# Patient Record
Sex: FEMALE | Race: WHITE | HISPANIC OR LATINO | Employment: PART TIME | URBAN - METROPOLITAN AREA
[De-identification: names, ages, dates, MRNs, and addresses within clinical notes are randomized per-mention and may not be internally consistent; named-entity substitution may affect disease eponyms.]

---

## 2019-11-05 ENCOUNTER — OFFICE VISIT - HEALTHEAST (OUTPATIENT)
Dept: INTERNAL MEDICINE | Facility: CLINIC | Age: 34
End: 2019-11-05

## 2019-11-05 DIAGNOSIS — R10.11 RUQ ABDOMINAL PAIN: ICD-10-CM

## 2019-11-05 DIAGNOSIS — K80.00 CALCULUS OF GALLBLADDER WITH ACUTE CHOLECYSTITIS WITHOUT OBSTRUCTION: ICD-10-CM

## 2019-11-05 RX ORDER — SERTRALINE HYDROCHLORIDE 100 MG/1
TABLET, FILM COATED ORAL
Status: SHIPPED | COMMUNITY
Start: 2019-03-19

## 2019-11-05 ASSESSMENT — MIFFLIN-ST. JEOR: SCORE: 1632.55

## 2019-11-08 ENCOUNTER — SURGERY - HEALTHEAST (OUTPATIENT)
Dept: SURGERY | Facility: CLINIC | Age: 34
End: 2019-11-08

## 2019-11-08 ENCOUNTER — OFFICE VISIT - HEALTHEAST (OUTPATIENT)
Dept: SURGERY | Facility: CLINIC | Age: 34
End: 2019-11-08

## 2019-11-08 DIAGNOSIS — K80.50 BILIARY COLIC: ICD-10-CM

## 2019-11-08 ASSESSMENT — MIFFLIN-ST. JEOR: SCORE: 1654.32

## 2019-11-13 ASSESSMENT — MIFFLIN-ST. JEOR: SCORE: 1650.69

## 2019-11-15 ENCOUNTER — SURGERY - HEALTHEAST (OUTPATIENT)
Dept: SURGERY | Facility: AMBULATORY SURGERY CENTER | Age: 34
End: 2019-11-15

## 2019-11-15 ENCOUNTER — ANESTHESIA - HEALTHEAST (OUTPATIENT)
Dept: SURGERY | Facility: AMBULATORY SURGERY CENTER | Age: 34
End: 2019-11-15

## 2019-11-15 ASSESSMENT — MIFFLIN-ST. JEOR: SCORE: 1650.69

## 2019-11-29 ENCOUNTER — OFFICE VISIT - HEALTHEAST (OUTPATIENT)
Dept: SURGERY | Facility: CLINIC | Age: 34
End: 2019-11-29

## 2019-11-29 DIAGNOSIS — K26.9 DUODENAL ULCER WITHOUT HEMORRHAGE OR PERFORATION AND WITHOUT OBSTRUCTION: ICD-10-CM

## 2019-11-29 DIAGNOSIS — K27.9 PEPTIC ULCER: ICD-10-CM

## 2019-11-29 RX ORDER — LANSOPRAZOLE 30 MG/1
30 CAPSULE, DELAYED RELEASE ORAL DAILY
Qty: 30 CAPSULE | Refills: 2 | Status: SHIPPED | OUTPATIENT
Start: 2019-11-29

## 2019-11-29 RX ORDER — HYDROCODONE BITARTRATE AND ACETAMINOPHEN 5; 325 MG/1; MG/1
1-2 TABLET ORAL
Status: SHIPPED | COMMUNITY
Start: 2019-11-29 | End: 2021-10-14

## 2019-11-29 RX ORDER — OMEPRAZOLE 20 MG/1
20 TABLET, DELAYED RELEASE ORAL
Status: SHIPPED | COMMUNITY
Start: 2019-11-29 | End: 2021-10-14

## 2019-11-29 ASSESSMENT — MIFFLIN-ST. JEOR: SCORE: 1637.09

## 2021-05-28 ENCOUNTER — RECORDS - HEALTHEAST (OUTPATIENT)
Dept: ADMINISTRATIVE | Facility: CLINIC | Age: 36
End: 2021-05-28

## 2021-05-30 ENCOUNTER — RECORDS - HEALTHEAST (OUTPATIENT)
Dept: ADMINISTRATIVE | Facility: CLINIC | Age: 36
End: 2021-05-30

## 2021-05-31 ENCOUNTER — RECORDS - HEALTHEAST (OUTPATIENT)
Dept: ADMINISTRATIVE | Facility: CLINIC | Age: 36
End: 2021-05-31

## 2021-06-02 ENCOUNTER — RECORDS - HEALTHEAST (OUTPATIENT)
Dept: ADMINISTRATIVE | Facility: CLINIC | Age: 36
End: 2021-06-02

## 2021-06-03 VITALS — HEIGHT: 68 IN | WEIGHT: 200 LBS | BODY MASS INDEX: 30.31 KG/M2

## 2021-06-03 VITALS
TEMPERATURE: 97.7 F | OXYGEN SATURATION: 98 % | WEIGHT: 196 LBS | RESPIRATION RATE: 20 BRPM | HEART RATE: 83 BPM | BODY MASS INDEX: 29.7 KG/M2 | DIASTOLIC BLOOD PRESSURE: 88 MMHG | SYSTOLIC BLOOD PRESSURE: 128 MMHG | HEIGHT: 68 IN

## 2021-06-03 VITALS
RESPIRATION RATE: 16 BRPM | DIASTOLIC BLOOD PRESSURE: 80 MMHG | OXYGEN SATURATION: 98 % | WEIGHT: 200.8 LBS | HEART RATE: 72 BPM | SYSTOLIC BLOOD PRESSURE: 116 MMHG | BODY MASS INDEX: 30.43 KG/M2 | HEIGHT: 68 IN

## 2021-06-03 NOTE — PROGRESS NOTES
Internal Medicine Office Visit  [facility]  Patient Name: Karla G Reyes  Patient Age: 34 y.o.  YOB: 1985  MRN: [id number]    Date of Visit: 2019  Reason for Office Visit:   Chief Complaint   Patient presents with     Hospital Visit Follow Up     seen by ED for gall stones. Was told by ED to follow up with PCP and then proceed with possible surgery. Still having pain in the upper right abd.            Assessment / Plan / Medical Decision Makin. RUQ abdominal pain  2. Calculus of gallbladder with acute cholecystitis without obstruction  - ondansetron (ZOFRAN) 4 MG tablet; Take 1 tablet (4 mg total) by mouth every 6 (six) hours for 3 days.  Dispense: 12 tablet; Refill: 0  Frequently utilizing the Percocet at bedtime as needed, specialty scheduling has been contacted they will reach out to general surgery to assist patient in scheduling appointment to discuss possible surgical intervention.    No orders of the defined types were placed in this encounter.  Followup: Return in about 1 week (around 2019). earlier if needed.    Health Maintenance Review  Health Maintenance   Topic Date Due     DEPRESSION FOLLOW UP  1985     PREVENTIVE CARE VISIT  1985     HPV TEST  1985     HIV SCREENING  2000     TD 18+ HE  2003     ADVANCE CARE PLANNING  2003     PAP SMEAR  2010     INFLUENZA VACCINE RULE BASED (1) 2019     TDAP ADULT ONE TIME DOSE  Completed         I am having Karla G. Reyes maintain her ibuprofen, hydrOXYzine pamoate, gabapentin, risperiDONE, traZODone, HYDROcodone-acetaminophen, sertraline, and ondansetron.      HPI:  Karla G Reyes is a 34 y.o. year old who presents to the office today accompanied by an .  Chronic conditions include esophageal reflux, anxiety, depression.  Patient was seen in the ED due to right upper quadrant abdominal discomfort on 2019.  She reported at that time  "the right flank pain started 2 days prior and felt more like pressure and worsened with moving, eating and breathing.  Patient also indicated she had felt bloated.  She was seen in walk-in care on 10/31/2019 for epigastric pain that radiates to the right side and had taken some Zantac without relief, radiological studies were completed that showed mild to moderate amount of stool and no apparent proximal colon without obstruction and ultrasound was unable to be performed but recommended and urinalysis was positive for UTI patient was subsequently started on Bactrim though patient continued to report suprapubic pain.  Patient denied any chest pain or vomiting.  Physical exam in the ED indicated tenderness in the right upper quadrant and epigastric region though no guarding or rebound noted.  Ultrasound of the abdomen showed tiny stones and sludge in the gallbladder without evidence of cholecystitis or biliary dilatation and fatty of trait of the liver was noted.  It was suspected that patient's symptoms were caused by cholelithiasis and had placed a consult to general surgery.  It was felt that this could be managed in the outpatient but patient was advised if her symptoms worsen she should return to the ED.    She reports the RUQ pain continues describing it at a \"pressure\" radiating to her back.  She states the pain increases with movement. She reports she has some nausea without vomiting and is taking the Zofran noting improvement of nauseas. She states she feels bloated with eating. She takes the percocet as needed and has not taken since last night.    Review of Systems- pertinent positive in bold:  Constitutional: Fever, chills, night sweats, fainting, weight change, fatigue, dizziness, sleeping difficulties, loud snoring/pauses in breathing  Eyes: change in vision, blurred or double vision, redness/eye pain  Ears, nose, mouth, throat: change in hearing, ear pain, hoarseness, difficulty swallowing, sores in the " mouth or throat  Respiratory: shortness of breath, cough, bloody sputum, wheezing  Cardiovascular: chest pain, palpitations   Gastrointestinal: abdominal pain, heartburn/indigestion, nausea/vomiting, change in appetite, change in bowel habits, constipation or diarrhea, rectal bleeding/dark stools, difficulty swallowing  Urinary: painful urination, frequent urination, urinary urgency/incontinence, blood in urine/dark urine, nocturia (new or increasing), muscle cramps, swelling of hands, feet, ankles, leg pain/redness  Skin: change in moles/freckles, rash, nodules  Hematologic/lymphatic: swollen lymph glands, abnormal bruising/bleeding  Endocrine: excessive thirst/urination, cold or heat intolerance  Neurologic/emotional: worrisome memory change, numbness/tingling, anxiety, mood swings      Current Scheduled Meds:  Outpatient Encounter Medications as of 11/5/2019   Medication Sig Dispense Refill     HYDROcodone-acetaminophen 5-325 mg per tablet Take 1 tablet by mouth every 4 (four) hours as needed for pain. 10 tablet 0     hydrOXYzine pamoate (VISTARIL) 25 MG capsule Take 25 mg by mouth 3 (three) times a day as needed for itching.       ibuprofen (ADVIL,MOTRIN) 200 MG tablet Take 400 mg by mouth every 6 (six) hours as needed for pain.       sertraline (ZOLOFT) 100 MG tablet Take 1 1/2 tab x tab daily       gabapentin (NEURONTIN) 100 MG capsule Take 100 mg by mouth 3 (three) times a day. 90 capsule 0     ondansetron (ZOFRAN) 4 MG tablet Take 1 tablet (4 mg total) by mouth every 6 (six) hours for 3 days. 12 tablet 0     risperiDONE (RISPERDAL) 0.25 MG tablet Take 1 tablet (0.25 mg total) by mouth 3 (three) times a day. 90 tablet 0     traZODone (DESYREL) 50 MG tablet Take 1 tablet (50 mg total) by mouth at bedtime as needed, may repeat once for sleep (melatonin augmentation or failure). 30 tablet 0     [DISCONTINUED] ondansetron (ZOFRAN) 4 MG tablet Take 1 tablet (4 mg total) by mouth every 6 (six) hours for 3 days. 12  "tablet 0     No facility-administered encounter medications on file as of 2019.      Past Medical History:   Diagnosis Date     Acid reflux      Anxiety      Depression      Medical history non-contributory      Past Surgical History:   Procedure Laterality Date      SECTION        SECTION       Social History     Tobacco Use     Smoking status: Never Smoker     Smokeless tobacco: Never Used   Substance Use Topics     Alcohol use: No     Drug use: No     No family history on file.  Objective / Physical Examination:  Vitals:    19 1641   BP: 128/88   Pulse: 83   Resp: 20   Temp: 97.7  F (36.5  C)   SpO2: 98%   Weight: 196 lb (88.9 kg)   Height: 5' 8\" (1.727 m)     Wt Readings from Last 3 Encounters:   19 196 lb (88.9 kg)   19 195 lb (88.5 kg)   18 200 lb (90.7 kg)     Body mass index is 29.8 kg/m .     General Appearance: Alert and oriented, cooperative, affect appropriate, speech clear, in no apparent distress  Head: Normocephalic, atraumatic  Eyes: Conjunctivae clear and sclerae non-icteric  Throat: Lips and mucosa moist.   Neck: Supple, trachea midline. No cervical adenopathy  Lungs: Clear to auscultation bilaterally. No adventitious lung sounds noted. Normal inspiratory and expiratory effort  Cardiovascular: Regular rate, normal S1, S2. No murmurs, rubs, or gallops  Abdomen: Bowel sounds active all four quadrants. Soft, mild RUQ tender upon palpation. No hepatomegaly or splenomegaly.   Extremities: Pulses 2+ and equal throughout. No edema.   Integumentary: Warm and dry.   Neuro: Alert and oriented, follows commands appropriately.     Us Abdomen Limited    Result Date: 2019  EXAM: US ABDOMEN LIMITED LOCATION: [facility] DATE/TIME: 2019 10:41 PM INDICATION: Abdominal pain. COMPARISON: None. TECHNIQUE: Limited abdominal ultrasound. FINDINGS: GALLBLADDER: There are tiny stones and probable sludge in the gallbladder. There a few areas of ringdown artifact " from the anterior gallbladder wall which can be seen with adenomyomatosis. There is no gallbladder wall thickening. No sonographic Acuna sign. BILE DUCTS: No biliary dilatation. The common duct is obscured by bowel gas. LIVER: Diffuse fatty infiltration of the liver. No focal mass. RIGHT KIDNEY: No hydronephrosis. PANCREAS: Obscured by bowel gas. No ascites.     1.  Tiny stones and sludge in the gallbladder. No evidence of cholecystitis. No biliary dilatation. 2.  Fatty infiltration of the liver.    Recent Results (from the past 240 hour(s))   Comprehensive metabolic panel   Result Value Ref Range    Sodium 140 136 - 145 mmol/L    Potassium 3.9 3.5 - 5.0 mmol/L    Chloride 105 98 - 107 mmol/L    CO2 26 22 - 31 mmol/L    Anion Gap, Calculation 9 5 - 18 mmol/L    Glucose 86 70 - 125 mg/dL    BUN 16 8 - 22 mg/dL    Creatinine 0.85 0.60 - 1.10 mg/dL    GFR MDRD Af Amer >60 >60 mL/min/1.73m2    GFR MDRD Non Af Amer >60 >60 mL/min/1.73m2    Bilirubin, Total 0.1 0.0 - 1.0 mg/dL    Calcium 9.3 8.5 - 10.5 mg/dL    Protein, Total 7.5 6.0 - 8.0 g/dL    Albumin 3.4 (L) 3.5 - 5.0 g/dL    Alkaline Phosphatase 95 45 - 120 U/L    AST 30 0 - 40 U/L    ALT 42 0 - 45 U/L   Lipase   Result Value Ref Range    Lipase 18 0 - 52 U/L   Pregnancy HCG, qualitative   Result Value Ref Range    Beta hCG Qualitative Negative Negative   C-Reactive Protein   Result Value Ref Range    CRP 4.7 (H) 0.0 - 0.8 mg/dL   HM1 (CBC with Diff)   Result Value Ref Range    WBC 10.3 4.0 - 11.0 thou/uL    RBC 4.53 3.80 - 5.40 mill/uL    Hemoglobin 12.9 12.0 - 16.0 g/dL    Hematocrit 38.4 35.0 - 47.0 %    MCV 85 80 - 100 fL    MCH 28.5 27.0 - 34.0 pg    MCHC 33.6 32.0 - 36.0 g/dL    RDW 13.2 11.0 - 14.5 %    Platelets 280 140 - 440 thou/uL    MPV 11.0 8.5 - 12.5 fL    Neutrophils % 53 50 - 70 %    Lymphocytes % 36 20 - 40 %    Monocytes % 9 2 - 10 %    Eosinophils % 2 0 - 6 %    Basophils % 1 0 - 2 %    Neutrophils Absolute 5.4 2.0 - 7.7 thou/uL    Lymphocytes  Absolute 3.7 0.8 - 4.4 thou/uL    Monocytes Absolute 0.9 0.0 - 0.9 thou/uL    Eosinophils Absolute 0.2 0.0 - 0.4 thou/uL    Basophils Absolute 0.1 0.0 - 0.2 thou/uL   Urinalysis-UC if Indicated   Result Value Ref Range    Color, UA Yellow Colorless, Yellow, Straw, Light Yellow    Clarity, UA Clear Clear    Glucose, UA Negative Negative    Bilirubin, UA Negative Negative    Ketones, UA Negative Negative, 60 mg/dL    Specific Gravity, UA 1.016 1.001 - 1.030    Blood, UA Small (!) Negative    pH, UA 6.0 4.5 - 8.0    Protein,  mg/dL (!) Negative mg/dL    Urobilinogen, UA <2.0 E.U./dL <2.0 E.U./dL, 2.0 E.U./dL    Nitrite, UA Negative Negative    Leukocytes, UA Trace (!) Negative    Bacteria, UA Few (!) None Seen hpf    RBC, UA 0-2 None Seen, 0-2 hpf    WBC, UA 5-10 (!) None Seen, 0-5 hpf    Squam Epithel, UA  (!) None Seen, 0-5 lpf    Mucus, UA Few (!) None Seen lpf   Culture, Urine   Result Value Ref Range    Culture No Growth        Julianna Muniz, CNP

## 2021-06-03 NOTE — PROGRESS NOTES
"HISTORY:  Karla B Reyes Garcia is s/p laparoscopic cholecystectomy on 11/15.  She presents with an .  Intraoperatively, it was suspected that she had a sealed off perforated ulcer.  Since her cholecystectomy, the symptoms she had have resolved.  Although, she does have some postprandial abdominal pain still.  It is located in the epigastric region.  She has been taking the Prilosec, but it makes her feel anxious.  She has not taken it for the last 3 days.    PHYSICAL EXAM:  Vitals:    11/29/19 0919   BP: 112/78   Pulse: 75   Resp: 14   Temp: 97.3  F (36.3  C)   TempSrc: Tympanic   SpO2: 97%   Weight: 197 lb (89.4 kg)   Height: 5' 8\" (1.727 m)     GEN: No acute distress, comfortable  LUNGS: CTA bilaterally  CV: RRR  ABD: Band-Aids and Steri-Strips removed.  Underlying incisions healing well without signs of infection.  EXT: No cyanosis, edema or obvious abnormalities    PATHOLOGY:  MICROSCOPIC AND DIAGNOSIS:  GALLBLADDER, LAPAROSCOPIC CHOLECYSTECTOMY:       - CHRONIC CHOLECYSTITIS AND CHOLESTEROLOSIS    ASSESSMENT:  Karla B Reyes Garcia is s/p laparoscopic cholecystectomy    PLAN:  Intraoperative findings discussed with patient  Continue PPI - a prescription for Prevacid was sent to her pharmacy.  She is to take this daily.  Needs EGD.  Will have this set up through my office with a partner who performs upper endoscopy.  We will give her a call next week to set this up.    Deneen Lowry, [facility]  [phone]    "

## 2021-06-03 NOTE — ANESTHESIA POSTPROCEDURE EVALUATION
Patient: Karla B Reyes Garcia  CHOLECYSTECTOMY, LAPAROSCOPIC  Anesthesia type: general    Patient location: Phase II Recovery  Last vitals:   Vitals Value Taken Time   /75 11/15/2019  4:30 PM   Temp 36.4  C (97.6  F) 11/15/2019  3:57 PM   Pulse 61 11/15/2019  4:33 PM   Resp 18 11/15/2019  4:30 PM   SpO2 97 % 11/15/2019  4:33 PM   Vitals shown include unvalidated device data.  Post vital signs: stable  Level of consciousness: awake and responds to simple questions  Post-anesthesia pain: pain controlled  Post-anesthesia nausea and vomiting: no  Pulmonary: unassisted, return to baseline  Cardiovascular: stable and blood pressure at baseline  Hydration: adequate  Anesthetic events: no    QCDR Measures:  ASA# 11 - Caridad-op Cardiac Arrest: ASA11B - Patient did NOT experience unanticipated cardiac arrest  ASA# 12 - Caridad-op Mortality Rate: ASA12B - Patient did NOT die  ASA# 13 - PACU Re-Intubation Rate: ASA13B - Patient did NOT require a new airway mgmt  ASA# 10 - Composite Anes Safety: ASA10A - No serious adverse event    Additional Notes:

## 2021-06-03 NOTE — ANESTHESIA CARE TRANSFER NOTE
Last vitals:   Vitals:    11/15/19 1505   BP: 118/60   Pulse: 78   Resp: 14   Temp: 36.6  C (97.8  F)   SpO2: 100%     Patient's level of consciousness is drowsy  Spontaneous respirations: yes  Maintains airway independently: yes  Dentition unchanged: yes  Oropharynx: oropharynx clear of all foreign objects    QCDR Measures:  ASA# 20 - Surgical Safety Checklist: WHO surgical safety checklist completed prior to induction    PQRS# 430 - Adult PONV Prevention: 4558F - Pt received => 2 anti-emetic agents (different classes) preop & intraop  ASA# 8 - Peds PONV Prevention: NA - Not pediatric patient, not GA or 2 or more risk factors NOT present  PQRS# 424 - Caridad-op Temp Management: 4559F - At least one body temp DOCUMENTED => 35.5C or 95.9F within required timeframe  PQRS# 426 - PACU Transfer Protocol: - Transfer of care checklist used  ASA# 14 - Acute Post-op Pain: ASA14B - Patient did NOT experience pain >= 7 out of 10

## 2021-06-03 NOTE — ANESTHESIA PREPROCEDURE EVALUATION
Anesthesia Evaluation      Patient summary reviewed     Airway   Mallampati: III  Neck ROM: full   Pulmonary - negative ROS    breath sounds clear to auscultation  (-) not a smoker                         Cardiovascular - negative ROS  Exercise tolerance: > or = 4 METS  Rhythm: regular        Neuro/Psych    (+) depression, anxiety/panic attacks,     Endo/Other    (+) obesity,   (-) not pregnant     GI/Hepatic/Renal    (+)   chronic renal disease CRI,   (-) GERD     Other findings:     NPO > 8 hrs      Dental - normal exam                        Anesthesia Plan  Planned anesthetic: general endotracheal    Scop patch  Zofran, decadron 10 mg  Propofol gtt with minimal gas  Esmolol  Toradol if ok with surgeon    ASA 2   Induction: intravenous   Anesthetic plan and risks discussed with: patient  Anesthesia plan special considerations: antiemetics,   Post-op plan: routine recovery

## 2021-06-03 NOTE — PROGRESS NOTES
HPI:   Karla B Reyes Garcia is a 34 y.o. female referred to see me for cholelithiasis.  She presents with a  and her mother.  She notes a a 1 week history of epigastric abdominal pain described as a pressure.  The pain radiates to her right upper quadrant and around to her back.  She states that the pain is more like a burning in her back.  The pain has been constant since it started but with episodes of worsening.  The pain is worsened after she eats.  The pain is associated with bloating, belching, and nausea.  She has been worked up in the past for prior episodes of abdominal pain.  She has been diagnosed with UTI, and ultimately with cholelithiasis.  She denies any nausea, vomiting, fevers, chills, jaundice or any other symptoms at present.       Allergies:  Lisinopril    Past medical history:  GERD  Anxiety/Depression  OCD  CKD    Past surgical history:   x2  Tubal ligation    Current Outpatient Medications:      cyclobenzaprine (FLEXERIL) 10 MG tablet, Take 1 tablet (10 mg total) by mouth 2 (two) times a day as needed for muscle spasms., Disp: 20 tablet, Rfl: 0     gabapentin (NEURONTIN) 100 MG capsule, Take 100 mg by mouth 3 (three) times a day., Disp: 90 capsule, Rfl: 0     HYDROcodone-acetaminophen 5-325 mg per tablet, Take 1 tablet by mouth every 4 (four) hours as needed for pain., Disp: 10 tablet, Rfl: 0     hydrOXYzine pamoate (VISTARIL) 25 MG capsule, Take 25 mg by mouth 3 (three) times a day as needed for itching., Disp: , Rfl:      ibuprofen (ADVIL,MOTRIN) 200 MG tablet, Take 400 mg by mouth every 6 (six) hours as needed for pain., Disp: , Rfl:      naproxen (NAPROSYN) 375 MG tablet, Take 1 tablet (375 mg total) by mouth 2 (two) times a day with meals., Disp: 20 tablet, Rfl: 0     ondansetron (ZOFRAN) 4 MG tablet, Take 1 tablet (4 mg total) by mouth every 6 (six) hours for 3 days., Disp: 12 tablet, Rfl: 0     risperiDONE (RISPERDAL) 0.25 MG tablet, Take 1 tablet (0.25 mg  "total) by mouth 3 (three) times a day., Disp: 90 tablet, Rfl: 0     sertraline (ZOLOFT) 100 MG tablet, Take 1 1/2 tab x tab daily, Disp: , Rfl:      traZODone (DESYREL) 50 MG tablet, Take 1 tablet (50 mg total) by mouth at bedtime as needed, may repeat once for sleep (melatonin augmentation or failure)., Disp: 30 tablet, Rfl: 0    Family history:  There is a history of hypertension and diabetes on her father's side.  Mother is alive and healthy.    Social history:  Reports that she has never smoked. She has never used smokeless tobacco. She reports that she does not drink alcohol or use drugs.    Review of Systems:  General: No complaints or constitutional symptoms  Skin: No complaints or symptoms   Hematologic/Lymphatic: No symptoms or complaints  Psychiatric: No symptoms or complaints  Endocrine: No excessive fatigue, no hypermetabolic symptoms reported  Respiratory: No cough, shortness of breath, or wheezing  Cardiovascular: No chest pain or dyspnea on exertion  Gastrointestinal: As per HPI  Musculoskeletal: No recent injuries reported  Neurological: No focal neurologic defects reported.      EXAM:  /80   Pulse 72   Resp 16   Ht 5' 8\" (1.727 m)   Wt 200 lb 12.8 oz (91.1 kg)   LMP 10/27/2019 (Exact Date)   SpO2 98%   BMI 30.53 kg/m    Body mass index is 30.53 kg/m .  General: Alert, cooperative, appears stated age   Skin: Skin color, texture, turgor normal, no rashes or lesions   Lymphatic: No obvious adenopathy, no swelling   Eyes: No scleral icterus, pupils equal  HENT: No traumatic injury to the head or face, no gross abnormalities  Lungs: Normal respiratory effort, breath sounds equal bilaterally  Heart: Regular rate and rhythm  Abdomen: Soft, nondistended, nontender to palpation  Musculoskeletal: No obvious swelling or deformities  Neurologic: Grossly intact    LABS:  Lab Results   Component Value Date    WBC 10.3 11/02/2019    HGB 12.9 11/02/2019    HCT 38.4 11/02/2019    MCV 85 11/02/2019    "  11/02/2019     Results from last 7 days   Lab Units 11/02/19  2203   LN-SODIUM mmol/L 140   LN-POTASSIUM mmol/L 3.9   LN-CHLORIDE mmol/L 105   LN-CO2 mmol/L 26   LN-BLOOD UREA NITROGEN mg/dL 16   LN-CREATININE mg/dL 0.85   LN-CALCIUM mg/dL 9.3     Lab Results   Component Value Date    ALT 42 11/02/2019    AST 30 11/02/2019    ALKPHOS 95 11/02/2019    BILITOT 0.1 11/02/2019       IMAGES:   Pertinent images personally reviewed by myself and discussed with the patient.  Radiology reports:  EXAM: US ABDOMEN LIMITED  LOCATION: [facility]  DATE/TIME: 11/2/2019 10:41 PM     INDICATION: Abdominal pain.  COMPARISON: None.  TECHNIQUE: Limited abdominal ultrasound.     FINDINGS:     GALLBLADDER: There are tiny stones and probable sludge in the gallbladder. There a few areas of ringdown artifact from the anterior gallbladder wall which can be seen with adenomyomatosis. There is no gallbladder wall thickening. No sonographic Acuna   sign.     BILE DUCTS: No biliary dilatation. The common duct is obscured by bowel gas.     LIVER: Diffuse fatty infiltration of the liver. No focal mass.     RIGHT KIDNEY: No hydronephrosis.     PANCREAS: Obscured by bowel gas.     No ascites.     IMPRESSION:   1.  Tiny stones and sludge in the gallbladder. No evidence of cholecystitis. No biliary dilatation.  2.  Fatty infiltration of the liver.    Assessment/Plan:   Karla B Reyes Garcia is a 34 y.o. female with signs and symptoms consistent with biliary colic or biliary disease.  I have explained the pathophysiology of gallbladder disease in detail as well as the surgical versus non-operative management strategies.      The risks of surgery were discussed in detail which include, but are not limited to, bleeding, infection, injury to surrounding structures, the need to convert to an open procedure, blood clots, stroke, heart attack and death.  Specifically we discussed the risk of damage to the common bile duct and hepatic  vessels, and the complications that may arise from damage to these structures.  Additionally, the risks of non operative management were discussed which include, but are not limited to, worsening infection, increased pain, sepsis and death.     She understands everything which was discussed and has consented to proceed with a laparoscopic cholecystectomy.  Her procedure can be done on an outpatient basis at the outpatient surgery center under general anesthesia.  Postoperative recovery and restrictions were discussed.  She works as a cook.  She would need a 2 weeks off of work.  All of their questions were answered to satisfaction.  She is interested in scheduling a date for surgery, which we will schedule accordingly.     Deneen Lowry, DO   [facility]  [phone]

## 2021-06-04 VITALS
TEMPERATURE: 97.3 F | HEART RATE: 75 BPM | OXYGEN SATURATION: 97 % | SYSTOLIC BLOOD PRESSURE: 112 MMHG | DIASTOLIC BLOOD PRESSURE: 78 MMHG | RESPIRATION RATE: 14 BRPM | HEIGHT: 68 IN | WEIGHT: 197 LBS | BODY MASS INDEX: 29.86 KG/M2

## 2021-06-16 PROBLEM — F23 BRIEF PSYCHOTIC DISORDER (H): Status: ACTIVE | Noted: 2018-07-11

## 2021-06-16 PROBLEM — K80.50 BILIARY COLIC: Status: ACTIVE | Noted: 2019-11-08

## 2021-06-16 PROBLEM — F32.A DEPRESSION: Status: ACTIVE | Noted: 2018-08-07

## 2021-06-16 PROBLEM — F41.1 GENERALIZED ANXIETY DISORDER: Status: ACTIVE | Noted: 2018-07-06

## 2021-06-16 PROBLEM — F41.0 PANIC ATTACKS: Status: ACTIVE | Noted: 2018-07-11

## 2021-08-02 ENCOUNTER — APPOINTMENT (OUTPATIENT)
Dept: CT IMAGING | Facility: HOSPITAL | Age: 36
End: 2021-08-02
Attending: EMERGENCY MEDICINE
Payer: COMMERCIAL

## 2021-08-02 ENCOUNTER — HOSPITAL ENCOUNTER (EMERGENCY)
Facility: HOSPITAL | Age: 36
Discharge: HOME | End: 2021-08-02
Attending: EMERGENCY MEDICINE | Admitting: EMERGENCY MEDICINE
Payer: COMMERCIAL

## 2021-08-02 VITALS
SYSTOLIC BLOOD PRESSURE: 134 MMHG | TEMPERATURE: 97.8 F | DIASTOLIC BLOOD PRESSURE: 76 MMHG | WEIGHT: 198 LBS | HEIGHT: 69 IN | RESPIRATION RATE: 18 BRPM | BODY MASS INDEX: 29.33 KG/M2 | OXYGEN SATURATION: 100 % | HEART RATE: 74 BPM

## 2021-08-02 DIAGNOSIS — R19.7 VOMITING AND DIARRHEA: ICD-10-CM

## 2021-08-02 DIAGNOSIS — R10.84 ABDOMINAL PAIN, GENERALIZED: ICD-10-CM

## 2021-08-02 DIAGNOSIS — R11.10 VOMITING AND DIARRHEA: ICD-10-CM

## 2021-08-02 LAB
ALBUMIN SERPL-MCNC: 3.6 G/DL (ref 3.5–5)
ALBUMIN UR-MCNC: 300 MG/DL
ALP SERPL-CCNC: 78 U/L (ref 45–120)
ALT SERPL W P-5'-P-CCNC: 25 U/L (ref 0–45)
ANION GAP SERPL CALCULATED.3IONS-SCNC: 6 MMOL/L (ref 5–18)
APPEARANCE UR: CLEAR
AST SERPL W P-5'-P-CCNC: 21 U/L (ref 0–40)
BACTERIA #/AREA URNS HPF: ABNORMAL /HPF
BASOPHILS # BLD AUTO: 0.1 10E3/UL (ref 0–0.2)
BASOPHILS NFR BLD AUTO: 1 %
BILIRUB SERPL-MCNC: 0.4 MG/DL (ref 0–1)
BILIRUB UR QL STRIP: NEGATIVE
BUN SERPL-MCNC: 12 MG/DL (ref 8–22)
CALCIUM SERPL-MCNC: 9 MG/DL (ref 8.5–10.5)
CHLORIDE BLD-SCNC: 107 MMOL/L (ref 98–107)
CO2 SERPL-SCNC: 26 MMOL/L (ref 22–31)
COLOR UR AUTO: ABNORMAL
CREAT SERPL-MCNC: 0.71 MG/DL (ref 0.6–1.1)
EOSINOPHIL # BLD AUTO: 0.1 10E3/UL (ref 0–0.7)
EOSINOPHIL NFR BLD AUTO: 2 %
ERYTHROCYTE [DISTWIDTH] IN BLOOD BY AUTOMATED COUNT: 12.8 % (ref 10–15)
GFR SERPL CREATININE-BSD FRML MDRD: >90 ML/MIN/1.73M2
GLUCOSE BLD-MCNC: 107 MG/DL (ref 70–125)
GLUCOSE UR STRIP-MCNC: NEGATIVE MG/DL
HCG SERPL QL: NEGATIVE
HCT VFR BLD AUTO: 39 % (ref 35–47)
HGB BLD-MCNC: 13.2 G/DL (ref 11.7–15.7)
HGB UR QL STRIP: ABNORMAL
IMM GRANULOCYTES # BLD: 0 10E3/UL
IMM GRANULOCYTES NFR BLD: 0 %
KETONES UR STRIP-MCNC: NEGATIVE MG/DL
LEUKOCYTE ESTERASE UR QL STRIP: NEGATIVE
LIPASE SERPL-CCNC: 19 U/L (ref 0–52)
LYMPHOCYTES # BLD AUTO: 1.8 10E3/UL (ref 0.8–5.3)
LYMPHOCYTES NFR BLD AUTO: 22 %
MCH RBC QN AUTO: 28.6 PG (ref 26.5–33)
MCHC RBC AUTO-ENTMCNC: 33.8 G/DL (ref 31.5–36.5)
MCV RBC AUTO: 84 FL (ref 78–100)
MONOCYTES # BLD AUTO: 0.4 10E3/UL (ref 0–1.3)
MONOCYTES NFR BLD AUTO: 5 %
MUCOUS THREADS #/AREA URNS LPF: PRESENT /LPF
NEUTROPHILS # BLD AUTO: 5.8 10E3/UL (ref 1.6–8.3)
NEUTROPHILS NFR BLD AUTO: 70 %
NITRATE UR QL: NEGATIVE
NRBC # BLD AUTO: 0 10E3/UL
NRBC BLD AUTO-RTO: 0 /100
PH UR STRIP: 6 [PH] (ref 5–7)
PLATELET # BLD AUTO: 273 10E3/UL (ref 150–450)
POTASSIUM BLD-SCNC: 4.4 MMOL/L (ref 3.5–5)
PROT SERPL-MCNC: 7.3 G/DL (ref 6–8)
RBC # BLD AUTO: 4.62 10E6/UL (ref 3.8–5.2)
RBC URINE: 1 /HPF
SODIUM SERPL-SCNC: 139 MMOL/L (ref 136–145)
SP GR UR STRIP: 1.02 (ref 1–1.03)
SQUAMOUS EPITHELIAL: 3 /HPF
UROBILINOGEN UR STRIP-MCNC: <2 MG/DL
WBC # BLD AUTO: 8.1 10E3/UL (ref 4–11)
WBC URINE: 1 /HPF

## 2021-08-02 PROCEDURE — 250N000011 HC RX IP 250 OP 636: Performed by: EMERGENCY MEDICINE

## 2021-08-02 PROCEDURE — 82040 ASSAY OF SERUM ALBUMIN: CPT | Performed by: EMERGENCY MEDICINE

## 2021-08-02 PROCEDURE — 36415 COLL VENOUS BLD VENIPUNCTURE: CPT | Performed by: EMERGENCY MEDICINE

## 2021-08-02 PROCEDURE — 99285 EMERGENCY DEPT VISIT HI MDM: CPT | Mod: 25

## 2021-08-02 PROCEDURE — 81001 URINALYSIS AUTO W/SCOPE: CPT | Performed by: EMERGENCY MEDICINE

## 2021-08-02 PROCEDURE — 96374 THER/PROPH/DIAG INJ IV PUSH: CPT | Mod: 59

## 2021-08-02 PROCEDURE — 85025 COMPLETE CBC W/AUTO DIFF WBC: CPT | Performed by: EMERGENCY MEDICINE

## 2021-08-02 PROCEDURE — 96361 HYDRATE IV INFUSION ADD-ON: CPT

## 2021-08-02 PROCEDURE — 258N000003 HC RX IP 258 OP 636: Performed by: EMERGENCY MEDICINE

## 2021-08-02 PROCEDURE — 83690 ASSAY OF LIPASE: CPT | Performed by: EMERGENCY MEDICINE

## 2021-08-02 PROCEDURE — 96375 TX/PRO/DX INJ NEW DRUG ADDON: CPT

## 2021-08-02 PROCEDURE — 74177 CT ABD & PELVIS W/CONTRAST: CPT

## 2021-08-02 PROCEDURE — 84703 CHORIONIC GONADOTROPIN ASSAY: CPT | Performed by: EMERGENCY MEDICINE

## 2021-08-02 RX ORDER — KETOROLAC TROMETHAMINE 15 MG/ML
15 INJECTION, SOLUTION INTRAMUSCULAR; INTRAVENOUS ONCE
Status: COMPLETED | OUTPATIENT
Start: 2021-08-02 | End: 2021-08-02

## 2021-08-02 RX ORDER — IOPAMIDOL 755 MG/ML
100 INJECTION, SOLUTION INTRAVASCULAR ONCE
Status: COMPLETED | OUTPATIENT
Start: 2021-08-02 | End: 2021-08-02

## 2021-08-02 RX ORDER — ONDANSETRON 2 MG/ML
4 INJECTION INTRAMUSCULAR; INTRAVENOUS ONCE
Status: COMPLETED | OUTPATIENT
Start: 2021-08-02 | End: 2021-08-02

## 2021-08-02 RX ORDER — ONDANSETRON 4 MG/1
4 TABLET, ORALLY DISINTEGRATING ORAL EVERY 8 HOURS PRN
Qty: 12 TABLET | Refills: 0 | Status: SHIPPED | OUTPATIENT
Start: 2021-08-02 | End: 2021-08-05

## 2021-08-02 RX ADMIN — SODIUM CHLORIDE 1000 ML: 9 INJECTION, SOLUTION INTRAVENOUS at 13:00

## 2021-08-02 RX ADMIN — ONDANSETRON 4 MG: 2 INJECTION INTRAMUSCULAR; INTRAVENOUS at 13:02

## 2021-08-02 RX ADMIN — IOPAMIDOL 100 ML: 755 INJECTION, SOLUTION INTRAVENOUS at 13:24

## 2021-08-02 RX ADMIN — KETOROLAC TROMETHAMINE 15 MG: 15 INJECTION, SOLUTION INTRAMUSCULAR; INTRAVENOUS at 13:04

## 2021-08-02 ASSESSMENT — ENCOUNTER SYMPTOMS
FATIGUE: 0
ARTHRALGIAS: 0
HEADACHES: 0
FLANK PAIN: 0
APPETITE CHANGE: 0
JOINT SWELLING: 0
MYALGIAS: 0
VOMITING: 1
LIGHT-HEADEDNESS: 0
COUGH: 0
RHINORRHEA: 0
COLOR CHANGE: 0
DIARRHEA: 1
SHORTNESS OF BREATH: 0
HEMATURIA: 0
ACTIVITY CHANGE: 0
WHEEZING: 0
NAUSEA: 0
ABDOMINAL PAIN: 1
FEVER: 0

## 2021-08-02 ASSESSMENT — MIFFLIN-ST. JEOR: SCORE: 1657.5

## 2021-08-02 NOTE — ED TRIAGE NOTES
Pt developed right abd pain at 3pm yesterday but it became worse today. No nausea. Had gallbladder removed  2 yrs ago. Slight dizziness.

## 2021-08-02 NOTE — ED PROVIDER NOTES
EMERGENCY DEPARTMENT ENCOUNTER      NAME: Karla B Reyes Garcia  AGE: 35 year old female  YOB: 1985  MRN: [id number]  EVALUATION DATE & TIME: 8/2/2021 12:36 PM    PCP: Alex Alexis    ED PROVIDER: Rico Luna D.O.      Chief Complaint   Patient presents with     Abdominal Pain         FINAL IMPRESSION:  1. Abdominal pain, generalized    2. Vomiting and diarrhea          ED COURSE & MEDICAL DECISION MAKING:    Pertinent Labs & Imaging studies reviewed. (See chart for details)  35 year old female presents to the Emergency Department for evaluation of right lower quadrant abdominal pain.  Patient states that the symptoms have been intermittent with worsening pain being associate with nausea vomiting.  States that she has had episodes of diarrhea as well.  Patient's abdominal exam is fairly reassuring, no guarding or rebound tenderness.  Will obtain CT scan.  Assessment for possible appendicitis versus kidney stone.    12:39 PM I met with the patient to gather history and to perform my initial exam. I discussed the plan for care while in the Emergency Department. PPE (protective eyewear, gloves, and N95 mask) was worn by me during patient encounters while patient wore mask.   1:46 PM I re-evaluated patient and updated her on results.  Symptoms improved.  Abdomen soft nontender palpation.  Will discharge home.  CT negative for any acute findings.  Do not feel antibiotics are necessary there is no signs of colitis.  Normal appendix.      At the conclusion of the encounter I discussed the results of all of the tests and the disposition. The questions were answered. The patient or family acknowledged understanding and was agreeable with the care plan.       0 minutes of critical care time     MEDICATIONS GIVEN IN THE EMERGENCY:  Medications   0.9% sodium chloride BOLUS (1,000 mLs Intravenous New Bag 8/2/21 1300)   ondansetron (ZOFRAN) injection 4 mg (4 mg Intravenous Given 8/2/21 1302)   ketorolac  (TORADOL) injection 15 mg (15 mg Intravenous Given 21 1304)   iopamidol (ISOVUE-370) solution 100 mL (100 mLs Intravenous Given 21 1324)       NEW PRESCRIPTIONS STARTED AT TODAY'S ER VISIT  New Prescriptions    No medications on file          =================================================================    HPI    Patient information was obtained from: Patient    Use of : N/A         Karla B Reyes Garcia is a 35 year old female with a pertinent history of perforated ulcer, x/p cholecystectomy, s/p  section, HTN, CKD, who presents to this ED by walk in for evaluation of abdominal pain.    Patient reports an onset of intermittent RLQ abdominal pain that radiates to her back since last night that became more severe this morning. No identifiable palliating or provoking factors. Patient states currently pain has slightly improved. She endorses vomiting, diarrhea, and chills. Denies dysuria, hematuria, or vaginal bleeding. Patient denies additional medical concerns or complaints at this time.      REVIEW OF SYSTEMS   Review of Systems   Constitutional: Negative for activity change, appetite change, fatigue and fever.   HENT: Negative for congestion and rhinorrhea.    Respiratory: Negative for cough, shortness of breath and wheezing.    Cardiovascular: Negative for chest pain and leg swelling.   Gastrointestinal: Positive for abdominal pain (RLQ radiates to back), diarrhea and vomiting. Negative for nausea.   Genitourinary: Negative for flank pain, hematuria and urgency.   Musculoskeletal: Negative for arthralgias, joint swelling and myalgias.   Skin: Negative for color change and rash.   Neurological: Negative for syncope, light-headedness and headaches.   Psychiatric/Behavioral: Negative for self-injury and suicidal ideas.      PAST MEDICAL HISTORY:  Past Medical History:   Diagnosis Date     Acid reflux      Anxiety      Chronic kidney disease      Depression      Hypertension      Medical  history non-contributory        PAST SURGICAL HISTORY:  Past Surgical History:   Procedure Laterality Date      SECTION        SECTION       LAPAROSCOPIC CHOLECYSTECTOMY N/A 11/15/2019    Procedure: CHOLECYSTECTOMY, LAPAROSCOPIC;  Surgeon: Deneen Lowry DO;  Location: [facility];  Service: General           CURRENT MEDICATIONS:    Current Facility-Administered Medications   Medication     0.9% sodium chloride BOLUS     Current Outpatient Medications   Medication     cyclobenzaprine (FLEXERIL) 10 MG tablet     gabapentin (NEURONTIN) 100 MG capsule     HYDROcodone-acetaminophen 5-325 mg per tablet     hydrOXYzine pamoate (VISTARIL) 25 MG capsule     lansoprazole (PREVACID) 30 MG capsule     omeprazole (PRILOSEC OTC) 20 MG tablet     omeprazole (PRILOSEC) 20 MG capsule     oxyCODONE-acetaminophen (PERCOCET/ENDOCET) 5-325 mg per tablet     polymyxin B-trimethoprim (POLYTRIM) 10,000 unit- 1 mg/mL Drop ophthalmic drops     risperiDONE (RISPERDAL) 0.25 MG tablet     sertraline (ZOLOFT) 100 MG tablet     traZODone (DESYREL) 50 MG tablet         ALLERGIES:  Allergies   Allergen Reactions     Lisinopril Cough       FAMILY HISTORY:  History reviewed. No pertinent family history.    SOCIAL HISTORY:   Social History     Socioeconomic History     Marital status:      Spouse name: Not on file     Number of children: Not on file     Years of education: Not on file     Highest education level: Not on file   Occupational History     Not on file   Tobacco Use     Smoking status: Never Smoker     Smokeless tobacco: Never Used   Substance and Sexual Activity     Alcohol use: No     Drug use: No     Sexual activity: Not on file   Other Topics Concern     Not on file   Social History Narrative     Not on file     Social Determinants of Health     Financial Resource Strain:      Difficulty of Paying Living Expenses:    Food Insecurity:      Worried About Running Out of Food in the Last Year:      Ran Out of  "Food in the Last Year:    Transportation Needs:      Lack of Transportation (Medical):      Lack of Transportation (Non-Medical):    Physical Activity:      Days of Exercise per Week:      Minutes of Exercise per Session:    Stress:      Feeling of Stress :    Social Connections:      Frequency of Communication with Friends and Family:      Frequency of Social Gatherings with Friends and Family:      Attends Mos[employer] Services:      Active Member of Clubs or Organizations:      Attends Club or Organization Meetings:      Marital Status:    Intimate Partner Violence:      Fear of Current or Ex-Partner:      Emotionally Abused:      Physically Abused:      Sexually Abused:        VITALS:  BP (!) 150/78   Pulse 69   Temp 96.8  F (36  C) (Tympanic)   Resp 12   Ht 1.753 m (5' 9\")   Wt 89.8 kg (198 lb)   BMI 29.24 kg/m      PHYSICAL EXAM    Physical Exam  Vitals and nursing note reviewed.   Constitutional:       General: She is not in acute distress.     Appearance: Normal appearance. She is not ill-appearing.   HENT:      Head: Normocephalic and atraumatic.      Right Ear: External ear normal.      Left Ear: External ear normal.      Nose: Nose normal.      Mouth/Throat:      Mouth: Mucous membranes are moist.   Eyes:      Extraocular Movements: Extraocular movements intact.      Pupils: Pupils are equal, round, and reactive to light.   Cardiovascular:      Rate and Rhythm: Normal rate and regular rhythm.   Pulmonary:      Effort: Pulmonary effort is normal. No respiratory distress.      Breath sounds: Normal breath sounds. No stridor. No wheezing.   Abdominal:      General: There is no distension.      Palpations: Abdomen is soft. There is no mass.      Tenderness: There is abdominal tenderness (right sided). There is no guarding or rebound.      Hernia: No hernia is present.   Musculoskeletal:         General: No swelling, tenderness or signs of injury. Normal range of motion.      Cervical back: Normal range of " motion.   Skin:     General: Skin is warm and dry.      Capillary Refill: Capillary refill takes less than 2 seconds.   Neurological:      General: No focal deficit present.      Mental Status: She is alert and oriented to person, place, and time.      Cranial Nerves: No cranial nerve deficit.      Motor: No weakness.      Coordination: Coordination normal.      Gait: Gait normal.   Psychiatric:         Mood and Affect: Mood normal.       LAB:  All pertinent labs reviewed and interpreted.  Labs Ordered and Resulted from Time of ED Arrival Up to the Time of Departure from the ED   ROUTINE UA WITH MICROSCOPIC REFLEX TO CULTURE - Abnormal; Notable for the following components:       Result Value    Blood Urine 0.06 mg/dL (*)     Protein Albumin Urine 300  (*)     Bacteria Urine Few (*)     Mucus Urine Present (*)     Squamous Epithelials Urine 3 (*)     All other components within normal limits    Narrative:     Urine Culture not indicated   COMPREHENSIVE METABOLIC PANEL - Normal   HCG QUALITATIVE PREGNANCY - Normal   LIPASE - Normal   CBC WITH PLATELETS AND DIFFERENTIAL   PERIPHERAL IV CATHETER   CBC WITH PLATELETS & DIFFERENTIAL    Narrative:     The following orders were created for panel order CBC with platelets + differential.  Procedure                               Abnormality         Status                     ---------                               -----------         ------                     CBC with platelets and d...[[id number]]                      Final result                 Please view results for these tests on the individual orders.       RADIOLOGY:  Reviewed all pertinent imaging. Please see official radiology report.  CT Abdomen Pelvis w Contrast   Final Result   IMPRESSION:       1.  Decompressed proximal to mid transverse colon, compromising evaluation for wall thickening. This could be physiologic; no pericolonic stranding to confidently suggest a colitis.      2.  Otherwise, unremarkable bowel  and appendix. No appendicitis.      3.  Hepatic steatosis.      4.  No free fluid, air or abscess.              PROCEDURES:         I, Marifer Alas, am serving as a scribe to document services personally performed by Dr. Rico Luna based on my observation and the provider's statements to me. I, Rico Luna, DO attest that Marifer Alas is acting in a scribe capacity, has observed my performance of the services and has documented them in accordance with my direction.    Rico Luna D.O.  Emergency Medicine  [facility] EMERGENCY DEPARTMENT  [address] [place] [id number]  [phone]  Dept: [phone]       Rico Luna DO  08/02/21 1401

## 2021-08-02 NOTE — DISCHARGE INSTRUCTIONS
Over the next 12-24 hours, if you develop worsening or have persistent abdominal pain, fever, increasing nausea orvomiting, unable to tolerate food or drink by mouth, have blood in your stool or if you have any concern please return to the emergency department immediately for evaluation.  Otherwise follow-up with the primary providerwithin the next week for further evaluation.

## 2021-08-06 PROCEDURE — 99285 EMERGENCY DEPT VISIT HI MDM: CPT | Mod: 25

## 2021-08-06 PROCEDURE — 96375 TX/PRO/DX INJ NEW DRUG ADDON: CPT

## 2021-08-06 PROCEDURE — 96374 THER/PROPH/DIAG INJ IV PUSH: CPT | Mod: 59

## 2021-08-07 ENCOUNTER — HOSPITAL ENCOUNTER (EMERGENCY)
Facility: CLINIC | Age: 36
Discharge: HOME | End: 2021-08-07
Attending: EMERGENCY MEDICINE | Admitting: EMERGENCY MEDICINE
Payer: COMMERCIAL

## 2021-08-07 ENCOUNTER — APPOINTMENT (OUTPATIENT)
Dept: CT IMAGING | Facility: CLINIC | Age: 36
End: 2021-08-07
Attending: EMERGENCY MEDICINE
Payer: COMMERCIAL

## 2021-08-07 VITALS
RESPIRATION RATE: 18 BRPM | BODY MASS INDEX: 29.24 KG/M2 | SYSTOLIC BLOOD PRESSURE: 120 MMHG | HEART RATE: 67 BPM | TEMPERATURE: 97.8 F | OXYGEN SATURATION: 98 % | DIASTOLIC BLOOD PRESSURE: 68 MMHG | WEIGHT: 198 LBS

## 2021-08-07 DIAGNOSIS — R10.13 ABDOMINAL PAIN, EPIGASTRIC: ICD-10-CM

## 2021-08-07 DIAGNOSIS — N83.202 CYST OF LEFT OVARY: ICD-10-CM

## 2021-08-07 LAB
ALBUMIN SERPL-MCNC: 3.9 G/DL (ref 3.5–5)
ALBUMIN UR-MCNC: 600 MG/DL
ALP SERPL-CCNC: 85 U/L (ref 45–120)
ALT SERPL W P-5'-P-CCNC: 25 U/L (ref 0–45)
ANION GAP SERPL CALCULATED.3IONS-SCNC: 8 MMOL/L (ref 5–18)
APPEARANCE UR: CLEAR
AST SERPL W P-5'-P-CCNC: 24 U/L (ref 0–40)
BASOPHILS # BLD AUTO: 0.1 10E3/UL (ref 0–0.2)
BASOPHILS NFR BLD AUTO: 1 %
BILIRUB SERPL-MCNC: 0.5 MG/DL (ref 0–1)
BILIRUB UR QL STRIP: NEGATIVE
BUN SERPL-MCNC: 16 MG/DL (ref 8–22)
C REACTIVE PROTEIN LHE: 0.6 MG/DL (ref 0–0.8)
CALCIUM SERPL-MCNC: 9.3 MG/DL (ref 8.5–10.5)
CHLORIDE BLD-SCNC: 105 MMOL/L (ref 98–107)
CO2 SERPL-SCNC: 27 MMOL/L (ref 22–31)
COLOR UR AUTO: YELLOW
CREAT SERPL-MCNC: 0.75 MG/DL (ref 0.6–1.1)
EOSINOPHIL # BLD AUTO: 0.3 10E3/UL (ref 0–0.7)
EOSINOPHIL NFR BLD AUTO: 3 %
ERYTHROCYTE [DISTWIDTH] IN BLOOD BY AUTOMATED COUNT: 13.2 % (ref 10–15)
GFR SERPL CREATININE-BSD FRML MDRD: >90 ML/MIN/1.73M2
GLUCOSE BLD-MCNC: 101 MG/DL (ref 70–125)
GLUCOSE UR STRIP-MCNC: NEGATIVE MG/DL
HCT VFR BLD AUTO: 40 % (ref 35–47)
HGB BLD-MCNC: 13.6 G/DL (ref 11.7–15.7)
HGB UR QL STRIP: ABNORMAL
HYALINE CASTS: 1 /LPF
IMM GRANULOCYTES # BLD: 0 10E3/UL
IMM GRANULOCYTES NFR BLD: 0 %
KETONES UR STRIP-MCNC: NEGATIVE MG/DL
LACTATE SERPL-SCNC: 0.8 MMOL/L (ref 0.7–2)
LEUKOCYTE ESTERASE UR QL STRIP: NEGATIVE
LIPASE SERPL-CCNC: 71 U/L (ref 0–52)
LYMPHOCYTES # BLD AUTO: 4 10E3/UL (ref 0.8–5.3)
LYMPHOCYTES NFR BLD AUTO: 39 %
MCH RBC QN AUTO: 28.5 PG (ref 26.5–33)
MCHC RBC AUTO-ENTMCNC: 34 G/DL (ref 31.5–36.5)
MCV RBC AUTO: 84 FL (ref 78–100)
MONOCYTES # BLD AUTO: 0.7 10E3/UL (ref 0–1.3)
MONOCYTES NFR BLD AUTO: 6 %
MUCOUS THREADS #/AREA URNS LPF: PRESENT /LPF
NEUTROPHILS # BLD AUTO: 5.1 10E3/UL (ref 1.6–8.3)
NEUTROPHILS NFR BLD AUTO: 51 %
NITRATE UR QL: NEGATIVE
NRBC # BLD AUTO: 0 10E3/UL
NRBC BLD AUTO-RTO: 0 /100
PH UR STRIP: 6 [PH] (ref 5–7)
PLATELET # BLD AUTO: 306 10E3/UL (ref 150–450)
POTASSIUM BLD-SCNC: 3.6 MMOL/L (ref 3.5–5)
PROT SERPL-MCNC: 7.7 G/DL (ref 6–8)
RBC # BLD AUTO: 4.77 10E6/UL (ref 3.8–5.2)
RBC URINE: 2 /HPF
SODIUM SERPL-SCNC: 140 MMOL/L (ref 136–145)
SP GR UR STRIP: 1.03 (ref 1–1.03)
SQUAMOUS EPITHELIAL: 2 /HPF
UROBILINOGEN UR STRIP-MCNC: <2 MG/DL
WBC # BLD AUTO: 10.1 10E3/UL (ref 4–11)
WBC URINE: 2 /HPF

## 2021-08-07 PROCEDURE — 250N000011 HC RX IP 250 OP 636: Performed by: EMERGENCY MEDICINE

## 2021-08-07 PROCEDURE — 96374 THER/PROPH/DIAG INJ IV PUSH: CPT | Mod: 59

## 2021-08-07 PROCEDURE — 250N000013 HC RX MED GY IP 250 OP 250 PS 637: Performed by: EMERGENCY MEDICINE

## 2021-08-07 PROCEDURE — 96375 TX/PRO/DX INJ NEW DRUG ADDON: CPT

## 2021-08-07 PROCEDURE — 74177 CT ABD & PELVIS W/CONTRAST: CPT

## 2021-08-07 PROCEDURE — 80053 COMPREHEN METABOLIC PANEL: CPT | Performed by: EMERGENCY MEDICINE

## 2021-08-07 PROCEDURE — 85025 COMPLETE CBC W/AUTO DIFF WBC: CPT | Performed by: EMERGENCY MEDICINE

## 2021-08-07 PROCEDURE — 250N000009 HC RX 250: Performed by: EMERGENCY MEDICINE

## 2021-08-07 PROCEDURE — 83605 ASSAY OF LACTIC ACID: CPT | Performed by: EMERGENCY MEDICINE

## 2021-08-07 PROCEDURE — 36415 COLL VENOUS BLD VENIPUNCTURE: CPT | Performed by: EMERGENCY MEDICINE

## 2021-08-07 PROCEDURE — 86141 C-REACTIVE PROTEIN HS: CPT | Performed by: EMERGENCY MEDICINE

## 2021-08-07 PROCEDURE — 81001 URINALYSIS AUTO W/SCOPE: CPT | Performed by: EMERGENCY MEDICINE

## 2021-08-07 PROCEDURE — 83690 ASSAY OF LIPASE: CPT | Performed by: EMERGENCY MEDICINE

## 2021-08-07 RX ORDER — IOPAMIDOL 755 MG/ML
100 INJECTION, SOLUTION INTRAVASCULAR ONCE
Status: COMPLETED | OUTPATIENT
Start: 2021-08-07 | End: 2021-08-07

## 2021-08-07 RX ORDER — MORPHINE SULFATE 4 MG/ML
4 INJECTION, SOLUTION INTRAMUSCULAR; INTRAVENOUS ONCE
Status: COMPLETED | OUTPATIENT
Start: 2021-08-07 | End: 2021-08-07

## 2021-08-07 RX ORDER — HYDROMORPHONE HYDROCHLORIDE 1 MG/ML
0.5 INJECTION, SOLUTION INTRAMUSCULAR; INTRAVENOUS; SUBCUTANEOUS
Status: COMPLETED | OUTPATIENT
Start: 2021-08-07 | End: 2021-08-07

## 2021-08-07 RX ORDER — ONDANSETRON 2 MG/ML
4 INJECTION INTRAMUSCULAR; INTRAVENOUS ONCE
Status: COMPLETED | OUTPATIENT
Start: 2021-08-07 | End: 2021-08-07

## 2021-08-07 RX ADMIN — IOPAMIDOL 100 ML: 755 INJECTION, SOLUTION INTRAVENOUS at 04:52

## 2021-08-07 RX ADMIN — LIDOCAINE HYDROCHLORIDE 30 ML: 20 SOLUTION ORAL; TOPICAL at 03:29

## 2021-08-07 RX ADMIN — HYDROMORPHONE HYDROCHLORIDE 0.5 MG: 1 INJECTION, SOLUTION INTRAMUSCULAR; INTRAVENOUS; SUBCUTANEOUS at 04:03

## 2021-08-07 RX ADMIN — ONDANSETRON 4 MG: 2 INJECTION INTRAMUSCULAR; INTRAVENOUS at 02:48

## 2021-08-07 RX ADMIN — MORPHINE SULFATE 4 MG: 4 INJECTION, SOLUTION INTRAMUSCULAR; INTRAVENOUS at 02:48

## 2021-08-07 NOTE — ED TRIAGE NOTES
Pt presents to the ED with c/o continued mid to right sided abdominal pain. Pt was seen at [facility] for this earlier this week. Pt continued nausea.

## 2021-08-07 NOTE — DISCHARGE INSTRUCTIONS
Continue the Prilosec as previously prescribed  Tylenol 650 mg every 4 hours as needed for pain  Maalox, Tums or Mylanta may help relieve your discomfort  Follow-up with gastroenterology as previously recommended  Follow up with your primary care doctor within the next few days for further evaluation and testing

## 2021-08-07 NOTE — ED PROVIDER NOTES
EMERGENCY DEPARTMENT ENCOUnter      NAME: Karla B Reyes Garcia  AGE: 35 year old female  YOB: 1985  MRN: [id number]  EVALUATION DATE & TIME: 8/7/2021  2:22 AM    PCP: Alex Alexis    ED PROVIDER: Stacie Ye MD      Chief Complaint   Patient presents with     Abdominal Pain         FINAL IMPRESSION:  1. Abdominal pain, epigastric    2. Cyst of left ovary          ED COURSE & MEDICAL DECISION MAKING:    In summary, the patient is a 35-year-old female presents to the emergency department for evaluation of upper abdominal pain of unclear etiology.  She has no evidence of choledocholithiasis, ureteral colic, or bowel obstruction.  Is noted to have a cyst on her left ovary which I do not think is causing her upper abdominal pain.    0226 Introduced myself to patient, gathered patient history, and performed an initial exam.  Morphine 4 mg IV was administered for pain.  Zofran 4 mg IV was administered for nausea.  0320 Rechecked and updated patient on her results. Patient reports pain medication initially improved pain, but then the pain returned. She states current pain is 8/10. Declines pain medication, but is agreeable with GI cocktail.   0353 Rechecked patient. Patient reports pain did not improve with GI cocktail and states that pain has worsened. Ordered dilaudid for pain, will repeat CT.   0457 Rechecked patient. Patient reports pain has improved to 7/10. Declines for more pain medication.   0521 Updated patient on her CT result. I discussed the plan for discharge with the patient, and patient is agreeable. We discussed supportive cares at home and reasons for return to the ER including new or worsening symptoms - all questions and concerns addressed. Patient to be discharged by RN.      At the conclusion of the encounter I discussed the results of all of the tests and the disposition. The questions were answered. The patient or family acknowledged understanding and was agreeable with  the care plan.         MEDICATIONS GIVEN IN THE EMERGENCY:  Medications   morphine (PF) injection 4 mg (4 mg Intravenous Given 8/7/21 0248)   ondansetron (ZOFRAN) injection 4 mg (4 mg Intravenous Given 8/7/21 0248)   lidocaine (XYLOCAINE) 2 % 15 mL, alum & mag hydroxide-simethicone (MAALOX) 15 mL GI Cocktail (30 mLs Oral Given 8/7/21 9499)   HYDROmorphone (PF) (DILAUDID) injection 0.5 mg (0.5 mg Intravenous Given 8/7/21 3003)   iopamidol (ISOVUE-370) solution 100 mL (100 mLs Intravenous Given 8/7/21 2052)       NEW PRESCRIPTIONS STARTED AT TODAY'S ER VISIT  Discharge Medication List as of 8/7/2021  5:31 AM             =================================================================    HPI        Karla B Reyes Garcia is a 35 year old female with a pertinent history of acid reflux, hypertension, anxiety, CKD, perforated ulcer, and s/p cholecystectomy who presents to this ED by walk in for evaluation of abdominal pain. Patient reports right-sided abdominal pain that began on Sunday (08/01). Patient was seen at [facility] the next day for further evaluation. She reports CT scan was normal and that she is suppose to have an endoscopy done for further evaluation. She states pain has been persisting and is constantly a 7/10 even when resting. She notes pain will increase to 9/10 intermittently and cause her to feel light-headed and dizzy. Tonight, patient reports pain is 9/10 and its the second episodic flare up today where pain is this intense. She endorses nausea and multiple episodes of emesis. Patient additionally reports constipation and loss of appetite. Last bowel movement was yesterday and was normal. Patient is currently seeing a kidney specialist and has had her gall bladder removed. Patient still has her appendix. Patient denies dysuria, hematuria, or any additional complaints at this time.     Patient denies smoking or drinking. Patient works in a restaurant.     REVIEW OF SYSTEMS     Constitutional:  Denies  fever or chills. Endorses decreased appetite.  HENT:  Denies sore throat   Respiratory:  Denies cough or shortness of breath   Cardiovascular:  Denies chest pain or palpitations  GI:  Endorses abdominal pain, nausea, vomiting, constipation.   : Denies dysuria or hematuria.   Musculoskeletal:  Denies any new extremity pain   Skin:  Denies rash   Neurologic:  Denies headache, focal weakness or sensory changes.   All other systems reviewed and are negative      PAST MEDICAL HISTORY:  Past Medical History:   Diagnosis Date     Acid reflux      Anxiety      Chronic kidney disease      Depression      Hypertension      Medical history non-contributory        PAST SURGICAL HISTORY:  Past Surgical History:   Procedure Laterality Date      SECTION        SECTION       LAPAROSCOPIC CHOLECYSTECTOMY N/A 11/15/2019    Procedure: CHOLECYSTECTOMY, LAPAROSCOPIC;  Surgeon: Deneen Lowry DO;  Location: [facility];  Service: General           CURRENT MEDICATIONS:    cyclobenzaprine (FLEXERIL) 10 MG tablet  gabapentin (NEURONTIN) 100 MG capsule  HYDROcodone-acetaminophen 5-325 mg per tablet  hydrOXYzine pamoate (VISTARIL) 25 MG capsule  lansoprazole (PREVACID) 30 MG capsule  omeprazole (PRILOSEC OTC) 20 MG tablet  omeprazole (PRILOSEC) 20 MG capsule  oxyCODONE-acetaminophen (PERCOCET/ENDOCET) 5-325 mg per tablet  polymyxin B-trimethoprim (POLYTRIM) 10,000 unit- 1 mg/mL Drop ophthalmic drops  risperiDONE (RISPERDAL) 0.25 MG tablet  sertraline (ZOLOFT) 100 MG tablet  traZODone (DESYREL) 50 MG tablet        ALLERGIES:  Allergies   Allergen Reactions     Lisinopril Cough       FAMILY HISTORY:  History reviewed. No pertinent family history.    SOCIAL HISTORY:   Social History     Socioeconomic History     Marital status:      Spouse name: None     Number of children: None     Years of education: None     Highest education level: None   Occupational History     None   Tobacco Use     Smoking status: Never  Smoker     Smokeless tobacco: Never Used   Substance and Sexual Activity     Alcohol use: No     Drug use: No     Sexual activity: None   Other Topics Concern     None   Social History Narrative     None     Social Determinants of Health     Financial Resource Strain:      Difficulty of Paying Living Expenses:    Food Insecurity:      Worried About Running Out of Food in the Last Year:      Ran Out of Food in the Last Year:    Transportation Needs:      Lack of Transportation (Medical):      Lack of Transportation (Non-Medical):    Physical Activity:      Days of Exercise per Week:      Minutes of Exercise per Session:    Stress:      Feeling of Stress :    Social Connections:      Frequency of Communication with Friends and Family:      Frequency of Social Gatherings with Friends and Family:      Attends [employer] Services:      Active Member of Clubs or Organizations:      Attends Club or Organization Meetings:      Marital Status:    Intimate Partner Violence:      Fear of Current or Ex-Partner:      Emotionally Abused:      Physically Abused:      Sexually Abused:        VITALS:  Patient Vitals for the past 24 hrs:   BP Temp Temp src Pulse Resp SpO2 Weight   08/07/21 0530 120/68 -- -- 67 18 98 % --   08/07/21 0502 129/67 97.8  F (36.6  C) Oral 74 18 100 % --   08/07/21 0430 119/68 -- -- 82 20 97 % --   08/07/21 0300 130/75 -- -- 74 -- 100 % --   08/07/21 0245 136/80 -- -- 61 -- 100 % --   08/06/21 2357 (!) 147/82 97.7  F (36.5  C) Oral 74 18 100 % 89.8 kg (198 lb)       PHYSICAL EXAM    Constitutional:  Well developed, Well nourished,  HENT:  Normocephalic, Atraumatic, Bilateral external ears normal, Oropharynx moist, Nose normal.   Neck:  Normal range of motion, No meningismus, No stridor.   Eyes:  EOMI, Conjunctiva normal, No discharge.   Respiratory:  Normal breath sounds, No respiratory distress, No wheezing, No chest tenderness.   Cardiovascular:  Normal heart rate, Normal rhythm, No murmurs  GI:  Soft,  diffuse upper tenderness, No guarding, No CVA tenderness.   Musculoskeletal:  Neurovascularly intact distally, No edema, No tenderness, No cyanosis, Good range of motion in all major joints. No tenderness to palpation or major deformities noted.   Integument:  Warm, Dry, No erythema, No rash.   Lymphatic:  No lymphadenopathy noted.   Neurologic:  Alert & oriented x 3, Normal motor function, Normal sensory function, No focal deficits noted.   Psychiatric:  Affect normal, Judgment normal, Mood normal.      LAB:  All pertinent labs reviewed and interpreted.  Results for orders placed or performed during the hospital encounter of 08/07/21   CT Abdomen Pelvis w Contrast    Impression    IMPRESSION:   1.  No inflammatory change, bowel obstruction or abscess. Normal appendix.  2.  3.4 cm left ovarian cyst.  3.  Cystic appearance of myometrium in the uterine fundus, nonspecific. Adenomyosis not excluded.   Comprehensive metabolic panel   Result Value Ref Range    Sodium 140 136 - 145 mmol/L    Potassium 3.6 3.5 - 5.0 mmol/L    Chloride 105 98 - 107 mmol/L    Carbon Dioxide (CO2) 27 22 - 31 mmol/L    Anion Gap 8 5 - 18 mmol/L    Urea Nitrogen 16 8 - 22 mg/dL    Creatinine 0.75 0.60 - 1.10 mg/dL    Calcium 9.3 8.5 - 10.5 mg/dL    Glucose 101 70 - 125 mg/dL    Alkaline Phosphatase 85 45 - 120 U/L    AST 24 0 - 40 U/L    ALT 25 0 - 45 U/L    Protein Total 7.7 6.0 - 8.0 g/dL    Albumin 3.9 3.5 - 5.0 g/dL    Bilirubin Total 0.5 0.0 - 1.0 mg/dL    GFR Estimate >90 >60 mL/min/1.73m2   Result Value Ref Range    Lipase 71 (H) 0 - 52 U/L   Lactic acid whole blood   Result Value Ref Range    Lactic Acid 0.8 0.7 - 2.0 mmol/L   CRP inflammation   Result Value Ref Range    CRP 0.6 0.0-<0.8 mg/dL   UA with Microscopic reflex to Culture    Specimen: Urine, Midstream   Result Value Ref Range    Color Urine Yellow Colorless, Straw, Light Yellow, Yellow    Appearance Urine Clear Clear    Glucose Urine Negative Negative mg/dL    Bilirubin Urine  Negative Negative    Ketones Urine Negative Negative mg/dL    Specific Gravity Urine 1.030 1.001 - 1.030    Blood Urine 0.06 mg/dL (A) Negative    pH Urine 6.0 5.0 - 7.0    Protein Albumin Urine 600  (A) Negative mg/dL    Urobilinogen Urine <2.0 <2.0 mg/dL    Nitrite Urine Negative Negative    Leukocyte Esterase Urine Negative Negative    Mucus Urine Present (A) None Seen /LPF    RBC Urine 2 <=2 /HPF    WBC Urine 2 <=5 /HPF    Squamous Epithelials Urine 2 (H) <=1 /HPF    Hyaline Casts Urine 1 <=2 /LPF   CBC with platelets and differential   Result Value Ref Range    WBC Count 10.1 4.0 - 11.0 10e3/uL    RBC Count 4.77 3.80 - 5.20 10e6/uL    Hemoglobin 13.6 11.7 - 15.7 g/dL    Hematocrit 40.0 35.0 - 47.0 %    MCV 84 78 - 100 fL    MCH 28.5 26.5 - 33.0 pg    MCHC 34.0 31.5 - 36.5 g/dL    RDW 13.2 10.0 - 15.0 %    Platelet Count 306 150 - 450 10e3/uL    % Neutrophils 51 %    % Lymphocytes 39 %    % Monocytes 6 %    % Eosinophils 3 %    % Basophils 1 %    % Immature Granulocytes 0 %    NRBCs per 100 WBC 0 <1 /100    Absolute Neutrophils 5.1 1.6 - 8.3 10e3/uL    Absolute Lymphocytes 4.0 0.8 - 5.3 10e3/uL    Absolute Monocytes 0.7 0.0 - 1.3 10e3/uL    Absolute Eosinophils 0.3 0.0 - 0.7 10e3/uL    Absolute Basophils 0.1 0.0 - 0.2 10e3/uL    Absolute Immature Granulocytes 0.0 <=0.0 10e3/uL    Absolute NRBCs 0.0 10e3/uL       RADIOLOGY:  I have independently reviewed and interpreted the above imaging, pending the final radiology read.  CT Abdomen Pelvis w Contrast   Final Result   IMPRESSION:    1.  No inflammatory change, bowel obstruction or abscess. Normal appendix.   2.  3.4 cm left ovarian cyst.   3.  Cystic appearance of myometrium in the uterine fundus, nonspecific. Adenomyosis not excluded.                  I, James Putnam, am serving as a scribe to document services personally performed by Dr. Trenton-Lidahl based on my observation and the provider's statements to me. I, Stacie Ye MD attest that James Phillips Putnam  is acting in a scribe capacity, has observed my performance of the services and has documented them in accordance with my direction.    Stacie Ye MD  Emergency Medicine  [facility] EMERGENCY ROOM  [address] [facility] [address]  [phone]  Dept: [phone]     Stacie Ye MD  08/10/21 0616

## 2021-10-14 ENCOUNTER — OFFICE VISIT (OUTPATIENT)
Dept: SURGERY | Facility: CLINIC | Age: 36
End: 2021-10-14
Payer: COMMERCIAL

## 2021-10-14 VITALS — BODY MASS INDEX: 29.39 KG/M2 | DIASTOLIC BLOOD PRESSURE: 74 MMHG | SYSTOLIC BLOOD PRESSURE: 126 MMHG | WEIGHT: 199 LBS

## 2021-10-14 DIAGNOSIS — G89.29 CHRONIC ABDOMINAL PAIN: Primary | ICD-10-CM

## 2021-10-14 DIAGNOSIS — R10.9 CHRONIC ABDOMINAL PAIN: Primary | ICD-10-CM

## 2021-10-14 PROCEDURE — 99215 OFFICE O/P EST HI 40 MIN: CPT | Performed by: SURGERY

## 2021-10-14 RX ORDER — LORATADINE 10 MG/1
1 TABLET ORAL DAILY
COMMUNITY
Start: 2021-04-30

## 2021-10-14 RX ORDER — IRBESARTAN 75 MG/1
TABLET ORAL
COMMUNITY
Start: 2021-08-16

## 2021-10-14 NOTE — PROGRESS NOTES
History:  Karla B Reyes Garcia is a 36 year old female who presents for recurrent abdominal pain.  History is obtained through the assistance of multiple interpreters over [facility].  She is known to myself for having a cholecystectomy 2 years ago.  Intraoperatively acute inflammatory changes were seen between the duodenum and liver, which was suspicious for a contained perforated ulcer.  At her postop, it was recommended that she take a PPI and have a follow-up EGD.  Neither of these happened at that time.  Since then, she has developed a new abdominal pain.  This started about 6 months ago and has become chronic.  The pain is located in the right upper quadrant.  She describes it as sharp.  It is always there but with exacerbations of pain.  She feels like it gets worse when she is nervous, worried, or stressed.  Over this time it has not been exacerbated or alleviated with eating.  She has not had any weight changes.  She sometimes had diarrhea, nausea, and vomiting.  She ultimately followed up with GI last month.  She had upper endoscopy which was normal.  She was started on Prilosec.  This has actually started to help her symptoms.  Ultimately she presented today 2 years after cholecystectomy concerned that a peptic ulcer needed to be removed.    Exam:  Vitals:    10/14/21 1300   BP: 126/74   BP Location: Right arm   Patient Position: Sitting   Cuff Size: Adult Small   Weight: 90.3 kg (199 lb)     GEN: No acute distress, comfortable  ABD: Surgical incisions have healed remarkably well.  Soft and nontender.  EXT: No cyanosis, edema or obvious abnormalities    ASSESSMENT:  Karla B Reyes Garcia is a 36 year old female with chronic RUQ abdominal pain of unknown etiology.  She is status post cholecystectomy 2 years ago.  Suspect that she might have a functional bowel disorder, sphincter of oddi syndrome or perhaps a psychosomatic disorder.  She does not appear to have a surgical issue.    PLAN:  40 minutes was spent  performing chart review, ED notes and CTs were reviewed by myself.  EGD from outside system was reviewed.  Laboratories were normal.  CT images were shown to the patient from both of her last ED visits.  She does not have any acute abnormality.  She does not need any surgery to remove an ulcer.  EGD failed to reveal an ulcer last month.  I think the next best step for follow-up will be with gastroenterology.  A referral was placed.  She can follow-up with general surgery as needed.    Deneen Lowry DO  General Surgeon  [facility] [place] [facility]  Surgery Clinic [address] [address]  Office: [phone]  Employed by - [facility]

## 2021-10-14 NOTE — LETTER
10/14/2021         RE: Karla B Reyes Garcia  [address] [place] [address]        Dear Colleague,    Thank you for referring your patient, Karla B Reyes Garcia, to the [facility] SURGERY CLINIC AND BARIATRICS CARE [place]. Please see a copy of my visit note below.    History:  Karla B Reyes Garcia is a 36 year old female who presents for recurrent abdominal pain.  History is obtained through the assistance of multiple interpreters over [facility].  She is known to myself for having a cholecystectomy 2 years ago.  Intraoperatively acute inflammatory changes were seen between the duodenum and liver, which was suspicious for a contained perforated ulcer.  At her postop, it was recommended that she take a PPI and have a follow-up EGD.  Neither of these happened at that time.  Since then, she has developed a new abdominal pain.  This started about 6 months ago and has become chronic.  The pain is located in the right upper quadrant.  She describes it as sharp.  It is always there but with exacerbations of pain.  She feels like it gets worse when she is nervous, worried, or stressed.  Over this time it has not been exacerbated or alleviated with eating.  She has not had any weight changes.  She sometimes had diarrhea, nausea, and vomiting.  She ultimately followed up with GI last month.  She had upper endoscopy which was normal.  She was started on Prilosec.  This has actually started to help her symptoms.  Ultimately she presented today 2 years after cholecystectomy concerned that a peptic ulcer needed to be removed.    Exam:  Vitals:    10/14/21 1300   BP: 126/74   BP Location: Right arm   Patient Position: Sitting   Cuff Size: Adult Small   Weight: 90.3 kg (199 lb)     GEN: No acute distress, comfortable  ABD: Surgical incisions have healed remarkably well.  Soft and nontender.  EXT: No cyanosis, edema or obvious abnormalities    ASSESSMENT:  Karla B Reyes Garcia is a 36 year old female with chronic  RUQ abdominal pain of unknown etiology.  She is status post cholecystectomy 2 years ago.  Suspect that she might have a functional bowel disorder, sphincter of oddi syndrome or perhaps a psychosomatic disorder.  She does not appear to have a surgical issue.    PLAN:  40 minutes was spent performing chart review, ED notes and CTs were reviewed by myself.  EGD from outside system was reviewed.  Laboratories were normal.  CT images were shown to the patient from both of her last ED visits.  She does not have any acute abnormality.  She does not need any surgery to remove an ulcer.  EGD failed to reveal an ulcer last month.  I think the next best step for follow-up will be with gastroenterology.  A referral was placed.  She can follow-up with general surgery as needed.    Deneen Lowry DO  General Surgeon  [facility]  Surgery Clinic - [address] [address]  Office: [phone]  Employed by - [facility]          Again, thank you for allowing me to participate in the care of your patient.        Sincerely,        Deneen Lowyr DO

## 2021-10-20 NOTE — TELEPHONE ENCOUNTER
REFERRAL INFORMATION:    Referring Provider:  Dr. Deneen Lowry     Referring Clinic:   Surgery Clinic and Bariat[facility] [place]     Reason for Visit/Diagnosis: Chronic abdominal pain      FUTURE VISIT INFORMATION:    Appointment Date: 11/26/2021    Appointment Time: 11 AM      NOTES STATUS DETAILS   OFFICE NOTE from Referring Provider Internal 10/14/2021 Office visit with Dr. Lowry     OFFICE NOTE from Other Specialist Care Everywhere/ Internal  8/16/2021 Office visit with Dr. Jamil Russell ([facility])     8/12/2021 Office visit with Dr. Alex Alexis ([facility])     11/5/19 Office visit with Julianna Muniz NP ([facility])     10/31/19 Office visit with Dr. Rosie Samuel ()     7/29/19 Office visit with Dr. Juan Pablo Hardin ([facility])     11/14/18 Office visit with TUNDE Del Cid CNP ([facility])     11/12/18 Office visit with Junito King PA-C ()      HOSPITAL DISCHARGE SUMMARY/  ED VISITS Internal 8/7/2021 ([facility])   8/2/2021, 5/3/18, 1/25/16 ([facility])     OPERATIVE REPORT N/A    MEDICATION LIST Internal         ENDOSCOPY  Care Everywhere EGD: 9/28/2021 ()    COLONOSCOPY N/A    ERCP N/A    EUS N/A    STOOL TESTING Care Everywhere 5/3/18   PERTINENT LABS Care Everywhere/ Internal     PATHOLOGY REPORTS (RELATED) Care Everywhere 9/28/2021   IMAGING (CT, MRI, EGD, MRCP, Small Bowel Follow Through/SBT, MR/CT Enterography) Internal CT Abdomen Pelvis: 8/7/2021, 8/2/2021, 3/10/19, 5/3/18  US Abdomen: 11/2/19

## 2021-11-26 ENCOUNTER — PRE VISIT (OUTPATIENT)
Dept: GASTROENTEROLOGY | Facility: CLINIC | Age: 36
End: 2021-11-26

## 2022-01-11 ENCOUNTER — DOCUMENTATION ONLY (OUTPATIENT)
Dept: GASTROENTEROLOGY | Facility: CLINIC | Age: 37
End: 2022-01-11
Payer: COMMERCIAL

## 2022-01-11 NOTE — PROGRESS NOTES
Called to remind patient of their upcoming appointment with our GI clinic, on January 18, 2022 at 2:00 PM    with Aniya Cisneros. This appointment is scheduled as a video visit. You will receive a call approximately 30 minutes prior to check you in, you must be in [place] for this visit., if your appointment is virtual (video or telephone) you need to be in [place] for the visit. To reschedule or cancel patient to call [phone].      Left voicemail   Cherise King LPN

## 2022-01-18 ENCOUNTER — VIRTUAL VISIT (OUTPATIENT)
Dept: GASTROENTEROLOGY | Facility: CLINIC | Age: 37
End: 2022-01-18
Attending: SURGERY
Payer: COMMERCIAL

## 2022-01-18 DIAGNOSIS — K92.1 BLOOD IN STOOL: ICD-10-CM

## 2022-01-18 DIAGNOSIS — R10.11 RUQ ABDOMINAL PAIN: Primary | ICD-10-CM

## 2022-01-18 DIAGNOSIS — G89.29 CHRONIC ABDOMINAL PAIN: ICD-10-CM

## 2022-01-18 DIAGNOSIS — R10.9 CHRONIC ABDOMINAL PAIN: ICD-10-CM

## 2022-01-18 DIAGNOSIS — K29.50 CHRONIC GASTRITIS WITHOUT BLEEDING, UNSPECIFIED GASTRITIS TYPE: ICD-10-CM

## 2022-01-18 PROCEDURE — 99214 OFFICE O/P EST MOD 30 MIN: CPT | Mod: TEL | Performed by: PHYSICIAN ASSISTANT

## 2022-01-18 RX ORDER — OMEPRAZOLE 40 MG/1
40 CAPSULE, DELAYED RELEASE ORAL
Qty: 90 CAPSULE | Refills: 3 | Status: SHIPPED | OUTPATIENT
Start: 2022-01-18

## 2022-01-18 NOTE — LETTER
"    1/18/2022         RE: Karla B Reyes Garcia  [address] [place] [address]        Dear Colleague,    Thank you for referring your patient, Karla B Reyes Garcia, to the [facility] GASTROENTEROLOGY CLINIC [place]. Please see a copy of my visit note below.    GI CLINIC VISIT    CC/REFERRING PROVIDER: Deneen Lowry    HPI: 36 year old female with PMH of acid reflux, anxiety, depression, HTN, CKD who is s/p CCY presenting to GI clinic for abdominal    Lakshmi states she has had \"strong\", non-radiating pain in the RUQ abdomen, reminiscent of previous gallbladder attacks, she feels almost like she cannot breathe due to the severity of the pain. The pain lasts for several days when present. She has been evaluated for this pain in the ED on several occasions (8/2/21 and 8/7/21), with multiple cross sectional studies unrevealing as to etiology for pain.  EGD was performed 9/28/2021 and was endoscopically unremarkable, with gastric biopsies with minimal non-specific chronic gastritis, negative for H pylori. She was recommended PPI, unclear dosage.    Since starting the PPI, the pain has decreased in severity and frequency, now rated at 4/10 in intensity since that time and occurring 1-2 times per week. The pain lasts about half a day now, previously would come for multiple days in a row.  No heartburn or reflux with use of omeprazole, which was started after the endoscopy. Previously, she has had intermittent GERD symptoms. Heavy foods, carbonated beverages can trigger the pain.  Daily BM, soft and formed, though she notes blood on the stool, which often accompanies the pain.    No nausea, vomiting, heartburn, early satiety, postprandial fullness, unintentional weight loss.    +NSAID at least several times per month, recently increased    ROS: 10pt ROS performed and otherwise negative.    PAST MEDICAL HISTORY:  Past Medical History:   Diagnosis Date     Acid reflux      Anxiety      Chronic kidney disease  "     Depression      Hypertension      Medical history non-contributory        PREVIOUS ABDOMINAL/GYNECOLOGIC SURGERIES:  Past Surgical History:   Procedure Laterality Date      SECTION        SECTION       LAPAROSCOPIC CHOLECYSTECTOMY N/A 11/15/2019    Procedure: CHOLECYSTECTOMY, LAPAROSCOPIC;  Surgeon: Deneen Lowry DO;  Location: [facility];  Service: General         PERTINENT MEDICATIONS:  Current Outpatient Medications   Medication Sig Dispense Refill     hydrOXYzine pamoate (VISTARIL) 25 MG capsule [HYDROXYZINE PAMOATE (VISTARIL) 25 MG CAPSULE] Take 25 mg by mouth 3 (three) times a day as needed for itching.       irbesartan (AVAPRO) 75 MG tablet TAKE 1 AND 1/2 TABLETS BY MOUTH EVERY EVENING       lansoprazole (PREVACID) 30 MG capsule [LANSOPRAZOLE (PREVACID) 30 MG CAPSULE] Take 1 capsule (30 mg total) by mouth daily. 30 capsule 2     loratadine (CLARITIN) 10 MG tablet Take 1 tablet by mouth daily       omeprazole (PRILOSEC) 20 MG capsule [OMEPRAZOLE (PRILOSEC) 20 MG CAPSULE] Take 1 capsule (20 mg total) by mouth daily before breakfast. 90 capsule 1     risperiDONE (RISPERDAL) 0.25 MG tablet [RISPERIDONE (RISPERDAL) 0.25 MG TABLET] Take 1 tablet (0.25 mg total) by mouth 3 (three) times a day. 90 tablet 0     sertraline (ZOLOFT) 100 MG tablet [SERTRALINE (ZOLOFT) 100 MG TABLET] Take 1 1/2 tab x tab daily       traZODone (DESYREL) 50 MG tablet [TRAZODONE (DESYREL) 50 MG TABLET] Take 1 tablet (50 mg total) by mouth at bedtime as needed, may repeat once for sleep (melatonin augmentation or failure). 30 tablet 0       SOCIAL HISTORY:    Social History     Socioeconomic History     Marital status:      Spouse name: Not on file     Number of children: Not on file     Years of education: Not on file     Highest education level: Not on file   Occupational History     Not on file   Tobacco Use     Smoking status: Never Smoker     Smokeless tobacco: Never Used   Substance and Sexual  Activity     Alcohol use: No     Drug use: No     Sexual activity: Not on file   Other Topics Concern     Not on file   Social History Narrative     Not on file     Social Determinants of Health     Financial Resource Strain: Not on file   Food Insecurity: Not on file   Transportation Needs: Not on file   Physical Activity: Not on file   Stress: Not on file   Social Connections: Not on file   Intimate Partner Violence: Not on file   Housing Stability: Not on file       FAMILY HISTORY:  No colon/panc/esophageal/other GI CA, no other Shi or other HPS-related Hernán. No IBD/celiac, no other AI/liver/thyroid disease.  No family history on file.    PHYSICAL EXAMINATION:  Vitals reviewed  There were no vitals taken for this visit.     PERTINENT STUDIES Reviewed in EMR    ASSESSMENT/PLAN:  36 year old female with PMH of acid reflux, anxiety, depression, HTN, CKD who is s/p CCY presenting to GI clinic for abdominal    # RUQ pain  # BRBPR  Initially present with severe RUQ pain associated with GERD symptoms, with CT AP x2 unrevealing. EGD was endoscopically unremarkable with minimal gastritis noted on gastric biopsies, negative for H pylori. Symptoms have improved with initiation of PPI, however still having bothersome breakthrough symptoms, in setting of ongoing NSAID use. Bowel habits reportedly regular and soft, however has BRBPR when pain is present.    -- Increase PPI to 40 BID for 6-8 weeks, then return to 40 mg daily or 20 mg twice daily, and then can continue to taper as able  -- If breakthrough symptoms, can trial Pepto Herbal blends Peppermint+Howard  -- Hold NSAID  -- Colonoscopy order placed     RTC 2-3 months    Thank you for this consultation. It was a pleasure to participate in the care of this patient; please contact us with any further questions.    Aniya Cisneros PA-C    Telephone 30 minutes

## 2022-01-18 NOTE — PATIENT INSTRUCTIONS
It was a pleasure taking care of you today.  I've included a brief summary of our discussion and care plan from today's visit below.  Please review this information with your primary care provider.  _______________________________________________________________________    My recommendations are summarized as follows:    -- Increase omeprazole to 40 mg twice per day, taken 30-60 minutes prior to breakfast and dinner  -- If you have breakthrough symptoms, you can trial over-the-counter Pepto Herbal blends Peppermint + Dunnegan  -- Avoid NSAID products such as Ibuprofen, Naproxen, and Aspirin. Tylenol is fine to use.  -- Order placed for colonoscopy. If you have not heard from endoscopy within a week, please call [phone] to schedule.     Return to GI Clinic in 2 months to review your progress.    ______________________________________________________________________    How do I schedule labs, imaging studies, or procedures that were ordered in clinic today?     Labs: To schedule lab appointment at the Clinic and Surgery Center, use my chart or call [phone]. If you have a [place] lab closer to home where you are regularly seen you can give them a call.     Procedures: If a colonoscopy, upper endoscopy, breath test, esophageal manometry, or pH impedence was ordered today, our endoscopy team will call you to schedule this. If you have not heard from our endoscopy team within a week, please call [phone] to schedule.     Imaging Studies: If you were scheduled for a CT scan, X-ray, MRI, ultrasound, HIDA scan or other imaging study, please call [phone] to have this scheduled.     Referral: If a referral to another specialty was ordered, expect a phone call or follow instructions above. If you have not heard from anyone regarding your referral in a week, please call our clinic to check the status.     Who do I call with any questions after my visit?  Please be in touch if there are any further  questions that arise following today's visit.  There are multiple ways to contact your gastroenterology care team.        During business hours, you may reach a Gastroenterology nurse at [phone]      To schedule or reschedule an appointment, please call [phone].       You can always send a secure message through Trips[employer].  Trips[employer] messages are answered by your nurse or doctor typically within 24 hours.  Please allow extra time on weekends and holidays.        For urgent/emergent questions after business hours, you may reach the on-call GI Fellow by contacting the [facility]  at [phone].     How will I get the results of any tests ordered?    You will receive all of your results.  If you have signed up for qLearningt, any tests ordered at your visit will be available to you after your physician reviews them.  Typically this takes 1-2 weeks.  If there are urgent results that require a change in your care plan, your physician or nurse will call you to discuss the next steps.      What is Tripsidea?  Tripsidea is a secure way for you to access all of your healthcare records from the [facility].  It is a web based computer program, so you can sign on to it from any location.  It also allows you to send secure messages to your care team.  I recommend signing up for [employer]ps[employer] access if you have not already done so and are comfortable with using a computer.      How to I schedule a follow-up visit?  If you did not schedule a follow-up visit today, please call [phone] to schedule a follow-up office visit.      Sincerely,    Aniya Cisneros PA-C  Division of Gastroenterology, Hepatology & Nutrition  [facility]

## 2022-01-18 NOTE — PROGRESS NOTES
"GI CLINIC VISIT    CC/REFERRING PROVIDER: Deneen Lowry    HPI: 36 year old female with PMH of acid reflux, anxiety, depression, HTN, CKD who is s/p CCY presenting to GI clinic for abdominal    Lakshmi states she has had \"strong\", non-radiating pain in the RUQ abdomen, reminiscent of previous gallbladder attacks, she feels almost like she cannot breathe due to the severity of the pain. The pain lasts for several days when present. She has been evaluated for this pain in the ED on several occasions (21 and 21), with multiple cross sectional studies unrevealing as to etiology for pain.  EGD was performed 2021 and was endoscopically unremarkable, with gastric biopsies with minimal non-specific chronic gastritis, negative for H pylori. She was recommended PPI, unclear dosage.    Since starting the PPI, the pain has decreased in severity and frequency, now rated at 4/10 in intensity since that time and occurring 1-2 times per week. The pain lasts about half a day now, previously would come for multiple days in a row.  No heartburn or reflux with use of omeprazole, which was started after the endoscopy. Previously, she has had intermittent GERD symptoms. Heavy foods, carbonated beverages can trigger the pain.  Daily BM, soft and formed, though she notes blood on the stool, which often accompanies the pain.    No nausea, vomiting, heartburn, early satiety, postprandial fullness, unintentional weight loss.    +NSAID at least several times per month, recently increased    ROS: 10pt ROS performed and otherwise negative.    PAST MEDICAL HISTORY:  Past Medical History:   Diagnosis Date     Acid reflux      Anxiety      Chronic kidney disease      Depression      Hypertension      Medical history non-contributory        PREVIOUS ABDOMINAL/GYNECOLOGIC SURGERIES:  Past Surgical History:   Procedure Laterality Date      SECTION        SECTION       LAPAROSCOPIC CHOLECYSTECTOMY N/A 11/15/2019    Procedure: " CHOLECYSTECTOMY, LAPAROSCOPIC;  Surgeon: Deneen Lowry DO;  Location: [facility] OR;  Service: General         PERTINENT MEDICATIONS:  Current Outpatient Medications   Medication Sig Dispense Refill     hydrOXYzine pamoate (VISTARIL) 25 MG capsule [HYDROXYZINE PAMOATE (VISTARIL) 25 MG CAPSULE] Take 25 mg by mouth 3 (three) times a day as needed for itching.       irbesartan (AVAPRO) 75 MG tablet TAKE 1 AND 1/2 TABLETS BY MOUTH EVERY EVENING       lansoprazole (PREVACID) 30 MG capsule [LANSOPRAZOLE (PREVACID) 30 MG CAPSULE] Take 1 capsule (30 mg total) by mouth daily. 30 capsule 2     loratadine (CLARITIN) 10 MG tablet Take 1 tablet by mouth daily       omeprazole (PRILOSEC) 20 MG capsule [OMEPRAZOLE (PRILOSEC) 20 MG CAPSULE] Take 1 capsule (20 mg total) by mouth daily before breakfast. 90 capsule 1     risperiDONE (RISPERDAL) 0.25 MG tablet [RISPERIDONE (RISPERDAL) 0.25 MG TABLET] Take 1 tablet (0.25 mg total) by mouth 3 (three) times a day. 90 tablet 0     sertraline (ZOLOFT) 100 MG tablet [SERTRALINE (ZOLOFT) 100 MG TABLET] Take 1 1/2 tab x tab daily       traZODone (DESYREL) 50 MG tablet [TRAZODONE (DESYREL) 50 MG TABLET] Take 1 tablet (50 mg total) by mouth at bedtime as needed, may repeat once for sleep (melatonin augmentation or failure). 30 tablet 0       SOCIAL HISTORY:    Social History     Socioeconomic History     Marital status:      Spouse name: Not on file     Number of children: Not on file     Years of education: Not on file     Highest education level: Not on file   Occupational History     Not on file   Tobacco Use     Smoking status: Never Smoker     Smokeless tobacco: Never Used   Substance and Sexual Activity     Alcohol use: No     Drug use: No     Sexual activity: Not on file   Other Topics Concern     Not on file   Social History Narrative     Not on file     Social Determinants of Health     Financial Resource Strain: Not on file   Food Insecurity: Not on file   Transportation  Needs: Not on file   Physical Activity: Not on file   Stress: Not on file   Social Connections: Not on file   Intimate Partner Violence: Not on file   Housing Stability: Not on file       FAMILY HISTORY:  No colon/panc/esophageal/other GI CA, no other Shi or other HPS-related Hernán. No IBD/celiac, no other AI/liver/thyroid disease.  No family history on file.    PHYSICAL EXAMINATION:  Vitals reviewed  There were no vitals taken for this visit.     PERTINENT STUDIES Reviewed in EMR    ASSESSMENT/PLAN:  36 year old female with PMH of acid reflux, anxiety, depression, HTN, CKD who is s/p CCY presenting to GI clinic for abdominal    # RUQ pain  # BRBPR  Initially present with severe RUQ pain associated with GERD symptoms, with CT AP x2 unrevealing. EGD was endoscopically unremarkable with minimal gastritis noted on gastric biopsies, negative for H pylori. Symptoms have improved with initiation of PPI, however still having bothersome breakthrough symptoms, in setting of ongoing NSAID use. Bowel habits reportedly regular and soft, however has BRBPR when pain is present.    -- Increase PPI to 40 BID for 6-8 weeks, then return to 40 mg daily or 20 mg twice daily, and then can continue to taper as able  -- If breakthrough symptoms, can trial Pepto Herbal blends Peppermint+Summer  -- Hold NSAID  -- Colonoscopy order placed     RTC 2-3 months    Thank you for this consultation. It was a pleasure to participate in the care of this patient; please contact us with any further questions.    Aniya Cisneros PA-C    Telephone 30 minutes

## 2022-01-18 NOTE — PROGRESS NOTES
Lakshmi is a 36 year old who is being evaluated via a billable telephone visit.      What phone number would you like to be contacted at? Lakshmi is a 36 year old who is being evaluated via a billable video visit.      How would you like to obtain your AVS? Milana      Phone call duration: 30 minutes

## 2022-02-01 ENCOUNTER — APPOINTMENT (OUTPATIENT)
Dept: INTERPRETER SERVICES | Facility: CLINIC | Age: 37
End: 2022-02-01
Payer: COMMERCIAL

## 2022-02-07 ENCOUNTER — TELEPHONE (OUTPATIENT)
Dept: GASTROENTEROLOGY | Facility: CLINIC | Age: 37
End: 2022-02-07
Payer: COMMERCIAL

## 2022-02-07 ENCOUNTER — HOSPITAL ENCOUNTER (OUTPATIENT)
Facility: AMBULATORY SURGERY CENTER | Age: 37
End: 2022-02-07
Attending: INTERNAL MEDICINE
Payer: COMMERCIAL

## 2022-02-07 NOTE — TELEPHONE ENCOUNTER
Screening Questions  Blue=prep questions Red=location Green=sedation   1. Are you active on mychart? N    2. What insurance is in the chart? [employer]     3.  Ordering/Referring Provider: Aniya Cisneros    4. BMI 29.4 , If greater than 40 review exclusion criteria also will need EXTENDED PREP    5.  Respiratory Screening (If yes to any of the following HOSPITAL setting only):     Do you use daily home oxygen? N  Do you have mod to severe Obstructive Sleep Apnea? N (can be seen at [facility] or hospital setting)    Do you have Pulmonary Hypertension? N   Do you have UNCONTROLLED asthma? N    6. Have you had a heart or lung transplant? N  (If yes, please review exclusion criteria)    7. Are you currently on dialysis?N  (If yes, schedule in HOSPITAL setting only)(If yes, please send Golytely prep)    8. Do you have chronic kidney disease? N (If yes, please send Golytely prep)    9. Have you had a stroke or Transient ischemic attack (TIA) within 6 months? N (If yes, do not schedule at [facility])    10. In the past 6 months, have you had any heart related issues including cardiomyopathy or heart attack? N (If yes, please review exclusion criteria)           If yes, did it require cardiac stenting or other implantable device?N  (If yes, please review exclusion criteria)      11. Do you have any implantable devices in your body (pacemaker, defib, LVAD)? N (If yes, schedule at [facility])    12. Do you take nitroglycerin? If yes, how often? N (if yes, schedule at HOSPITAL setting)    13. Are you currently taking any blood thinners?N (If yes- inform patient to follow up with PCP or provider for follow up instructions)     14. Are you a diabetic? N (If yes, please send Golytely prep)    15. (Females) Are you currently pregnant? N  If yes, how many weeks?      16. Are you taking any prescription pain medications on a routine schedule? Y If yes, MAC sedation and patient will need EXTENDED PREP. OXY - Just taking them from recent  surgery, otherwise no    17. Do you have any chemical dependencies such as alcohol, street drugs, or methadone? N If yes, MAC sedation     18. Do you have any history of post-traumatic stress syndrome, severe anxiety or history of psychosis? Y Mild psychosis  If yes, MAC sedation.     19. Do you transfer independently? Y    20.  Do you have any issues with constipation? N   If yes, pt will need EXTENDED PREP     21. Preferred Pharmacy for Pre Prescription N    Scheduling Details    Which Colonoscopy Prep was Sent?: M Prep  Type of Procedure Scheduled: Colon  Surgeon: Junaid  Date of Procedure: 5-9  Location: [facility]  Caller (Please ask for phone number if not scheduled by patient): [facility]      Sedation Type: MAC  Conscious Sedation- Needs  for 6 hours after the procedure  MAC/General-Needs  for 24 hours after procedure    Pre-op Required at [facility], [place], [place] and OR for MAC sedation:   (if yes advise patient they will need a pre-op prior to procedure)      Informed patient they will need an adult  Y  Cannot take any type of public or medical transportation alone    Pre-Procedure Covid test to be completed at [facility] or Externally: Y going through [facility]    Confirmed Nurse will call to complete assessment Y    Additional comments:  ([facility] PATIENTS NEED EXTENDED PREP)

## 2022-03-10 NOTE — TELEPHONE ENCOUNTER
Reschedule same day/time with different provider due to Dr. Quiroga is out. Procedure is now with Dr. Reyes.

## 2022-03-25 DIAGNOSIS — Z11.59 ENCOUNTER FOR SCREENING FOR OTHER VIRAL DISEASES: Primary | ICD-10-CM

## 2022-04-27 ENCOUNTER — TELEPHONE (OUTPATIENT)
Dept: GASTROENTEROLOGY | Facility: CLINIC | Age: 37
End: 2022-04-27
Payer: COMMERCIAL

## 2023-02-17 ENCOUNTER — HOSPITAL ENCOUNTER (EMERGENCY)
Facility: HOSPITAL | Age: 38
Discharge: HOME | End: 2023-02-17
Admitting: PHYSICIAN ASSISTANT

## 2023-02-17 ENCOUNTER — APPOINTMENT (OUTPATIENT)
Dept: CT IMAGING | Facility: HOSPITAL | Age: 38
End: 2023-02-17
Attending: PHYSICIAN ASSISTANT

## 2023-02-17 VITALS
TEMPERATURE: 97.7 F | RESPIRATION RATE: 20 BRPM | SYSTOLIC BLOOD PRESSURE: 132 MMHG | DIASTOLIC BLOOD PRESSURE: 84 MMHG | HEART RATE: 92 BPM | OXYGEN SATURATION: 100 %

## 2023-02-17 DIAGNOSIS — K52.9 COLITIS: ICD-10-CM

## 2023-02-17 DIAGNOSIS — R10.9 RIGHT SIDED ABDOMINAL PAIN: ICD-10-CM

## 2023-02-17 DIAGNOSIS — R11.0 NAUSEA: ICD-10-CM

## 2023-02-17 LAB
ALBUMIN SERPL BCG-MCNC: 4.2 G/DL (ref 3.5–5.2)
ALBUMIN UR-MCNC: 200 MG/DL
ALP SERPL-CCNC: 86 U/L (ref 35–104)
ALT SERPL W P-5'-P-CCNC: 22 U/L (ref 10–35)
ANION GAP SERPL CALCULATED.3IONS-SCNC: 9 MMOL/L (ref 7–15)
APPEARANCE UR: ABNORMAL
AST SERPL W P-5'-P-CCNC: 23 U/L (ref 10–35)
BILIRUB DIRECT SERPL-MCNC: <0.2 MG/DL (ref 0–0.3)
BILIRUB SERPL-MCNC: 0.2 MG/DL
BILIRUB UR QL STRIP: NEGATIVE
BUN SERPL-MCNC: 18.2 MG/DL (ref 6–20)
CALCIUM SERPL-MCNC: 9.2 MG/DL (ref 8.6–10)
CHLORIDE SERPL-SCNC: 104 MMOL/L (ref 98–107)
COLOR UR AUTO: YELLOW
CREAT SERPL-MCNC: 0.88 MG/DL (ref 0.51–0.95)
DEPRECATED HCO3 PLAS-SCNC: 28 MMOL/L (ref 22–29)
GFR SERPL CREATININE-BSD FRML MDRD: 86 ML/MIN/1.73M2
GLUCOSE SERPL-MCNC: 96 MG/DL (ref 70–99)
GLUCOSE UR STRIP-MCNC: NEGATIVE MG/DL
HCG UR QL: NEGATIVE
HGB UR QL STRIP: ABNORMAL
HYALINE CASTS: 1 /LPF
KETONES UR STRIP-MCNC: NEGATIVE MG/DL
LEUKOCYTE ESTERASE UR QL STRIP: NEGATIVE
LIPASE SERPL-CCNC: 33 U/L (ref 13–60)
MUCOUS THREADS #/AREA URNS LPF: PRESENT /LPF
NITRATE UR QL: NEGATIVE
PH UR STRIP: 6 [PH] (ref 5–7)
POTASSIUM SERPL-SCNC: 4.1 MMOL/L (ref 3.4–5.3)
PROT SERPL-MCNC: 7.6 G/DL (ref 6.4–8.3)
RBC URINE: >182 /HPF
SODIUM SERPL-SCNC: 141 MMOL/L (ref 136–145)
SP GR UR STRIP: 1.02 (ref 1–1.03)
SQUAMOUS EPITHELIAL: 8 /HPF
UROBILINOGEN UR STRIP-MCNC: <2 MG/DL
WBC URINE: 6 /HPF

## 2023-02-17 PROCEDURE — 36415 COLL VENOUS BLD VENIPUNCTURE: CPT | Performed by: STUDENT IN AN ORGANIZED HEALTH CARE EDUCATION/TRAINING PROGRAM

## 2023-02-17 PROCEDURE — 250N000011 HC RX IP 250 OP 636: Performed by: PHYSICIAN ASSISTANT

## 2023-02-17 PROCEDURE — 83690 ASSAY OF LIPASE: CPT | Performed by: PHYSICIAN ASSISTANT

## 2023-02-17 PROCEDURE — 85007 BL SMEAR W/DIFF WBC COUNT: CPT | Performed by: STUDENT IN AN ORGANIZED HEALTH CARE EDUCATION/TRAINING PROGRAM

## 2023-02-17 PROCEDURE — 85027 COMPLETE CBC AUTOMATED: CPT | Performed by: STUDENT IN AN ORGANIZED HEALTH CARE EDUCATION/TRAINING PROGRAM

## 2023-02-17 PROCEDURE — 74176 CT ABD & PELVIS W/O CONTRAST: CPT

## 2023-02-17 PROCEDURE — 82248 BILIRUBIN DIRECT: CPT | Performed by: PHYSICIAN ASSISTANT

## 2023-02-17 PROCEDURE — 81025 URINE PREGNANCY TEST: CPT | Performed by: EMERGENCY MEDICINE

## 2023-02-17 PROCEDURE — 96374 THER/PROPH/DIAG INJ IV PUSH: CPT

## 2023-02-17 PROCEDURE — 81001 URINALYSIS AUTO W/SCOPE: CPT | Performed by: EMERGENCY MEDICINE

## 2023-02-17 PROCEDURE — 99285 EMERGENCY DEPT VISIT HI MDM: CPT | Mod: 25

## 2023-02-17 RX ORDER — KETOROLAC TROMETHAMINE 15 MG/ML
15 INJECTION, SOLUTION INTRAMUSCULAR; INTRAVENOUS ONCE
Status: COMPLETED | OUTPATIENT
Start: 2023-02-17 | End: 2023-02-17

## 2023-02-17 RX ORDER — ONDANSETRON 4 MG/1
4 TABLET, ORALLY DISINTEGRATING ORAL EVERY 8 HOURS PRN
Qty: 15 TABLET | Refills: 0 | Status: SHIPPED | OUTPATIENT
Start: 2023-02-17 | End: 2023-02-20

## 2023-02-17 RX ORDER — DICYCLOMINE HCL 20 MG
20 TABLET ORAL 4 TIMES DAILY PRN
Qty: 15 TABLET | Refills: 0 | Status: SHIPPED | OUTPATIENT
Start: 2023-02-17 | End: 2023-02-27

## 2023-02-17 RX ADMIN — KETOROLAC TROMETHAMINE 15 MG: 15 INJECTION, SOLUTION INTRAMUSCULAR; INTRAVENOUS at 22:26

## 2023-02-17 ASSESSMENT — ENCOUNTER SYMPTOMS
BACK PAIN: 1
ABDOMINAL PAIN: 1
NAUSEA: 1
VOMITING: 0
DIARRHEA: 0
FEVER: 0
DYSURIA: 0
FREQUENCY: 1

## 2023-02-17 NOTE — Clinical Note
Karla Reyes Garcia was seen and treated in our emergency department on 2/17/2023.  She may return to work on 02/20/2023.       If you have any questions or concerns, please don't hesitate to call.      Cassie Joseph PA-C

## 2023-02-18 LAB
BASOPHILS # BLD MANUAL: 0.2 10E3/UL (ref 0–0.2)
BASOPHILS NFR BLD MANUAL: 2 %
EOSINOPHIL # BLD MANUAL: 0.1 10E3/UL (ref 0–0.7)
EOSINOPHIL NFR BLD MANUAL: 1 %
ERYTHROCYTE [DISTWIDTH] IN BLOOD BY AUTOMATED COUNT: 13.6 % (ref 10–15)
HCT VFR BLD AUTO: 39.4 % (ref 35–47)
HGB BLD-MCNC: 13 G/DL (ref 11.7–15.7)
LYMPHOCYTES # BLD MANUAL: 5.1 10E3/UL (ref 0.8–5.3)
LYMPHOCYTES NFR BLD MANUAL: 48 %
MCH RBC QN AUTO: 27.7 PG (ref 26.5–33)
MCHC RBC AUTO-ENTMCNC: 33 G/DL (ref 31.5–36.5)
MCV RBC AUTO: 84 FL (ref 78–100)
MONOCYTES # BLD MANUAL: 0.8 10E3/UL (ref 0–1.3)
MONOCYTES NFR BLD MANUAL: 8 %
NEUTROPHILS # BLD MANUAL: 4.3 10E3/UL (ref 1.6–8.3)
NEUTROPHILS NFR BLD MANUAL: 41 %
PLAT MORPH BLD: NORMAL
PLATELET # BLD AUTO: 300 10E3/UL (ref 150–450)
RBC # BLD AUTO: 4.69 10E6/UL (ref 3.8–5.2)
RBC MORPH BLD: NORMAL
WBC # BLD AUTO: 10.6 10E3/UL (ref 4–11)

## 2023-02-18 NOTE — ED PROVIDER NOTES
EMERGENCY DEPARTMENT ENCOUNTER      NAME: Karla B Reyes Garcia  AGE: 37 year old female  YOB: 1985  MRN: [id number]  EVALUATION DATE & TIME: No admission date for patient encounter.    PCP: Alex Alexis    ED PROVIDER: Cassie Joseph PA-C      Chief Complaint   Patient presents with     Abdominal Pain     Back Pain         FINAL IMPRESSION:  1. Nausea    2. Right sided abdominal pain    3. Colitis          ED COURSE & MEDICAL DECISION MAKIN:38 PM I introduced myself to patient, performed initial HPI and examination.   10:03 PM Rechecked patient, discussed results and plan for discharge.       37 year old female with PMH perforated ulcer, anemia, depression presents to the Emergency Department for evaluation of right flank pain, abdominal pain. Reports pain started suddenly last night. Endorses nausea without vomiting. No dysuria or changes in bowel movements. Patient is on her menstrual cycle. History of complicated back surgery with revision, cholecystectomy.    Differential includes choledocholithiasis, gastritis, colitis, bowel obstruction, ureterolithiasis, pyelonephritis, appendicitis, and others. Less suspicion for ovarian cyst or torsion or ectopic pregnancy with pain localized more upper abdomen.     VSS, afebrile  Exam with mild right upper abdominal tenderness, no CVAT. Overall well appearing in no acute distress.   Labs with no leukocytosis, no anemia. No notable electrolyte derangement. Creatinine is WNL. LFT and Lipase WNL. UA with hematuria but patient does report she is on her menstrual cycle. Otherwise not consistent with infection.    CT obtained with primary concern for ureterolithaisis with hematuria and flank pain. CT shows no renal/ureteral pathology. Does show circumferential thickening of rectum which could be seen with mild colitis. This would ultimately fit with nausea, abdominal pain although no diarrhea.     Work up is ultimately reassuring. Pain is controlled.  Discussed results with patient. Instructed on at home management, close follow up with PCP for recheck and red flags/indications to return to the emergency department. All questions were answered to the best of my ability and patient is agreeable with plan.       Medical Decision Making    History:    Supplemental history from: Documented in chart, if applicable    External Record(s) reviewed: Documented in chart, if applicable.    Work Up:    Chart documentation includes differential considered and any EKGs or imaging independently interpreted by provider.    In additional to work up documented, I considered the following work up: Documented in chart, if applicable.    External consultation:    Discussion of management with another provider: Documented in chart, if applicable    Complicating factors:    Care impacted by chronic illness: N/A    Care affected by social determinants of health: N/A    Disposition considerations: Discharge. I prescribed additional prescription strength medication(s) as charted. See documentation for any additional details.      MEDICATIONS GIVEN IN THE EMERGENCY:  Medications   ketorolac (TORADOL) injection 15 mg (15 mg Intravenous Given 2/17/23 2226)       NEW PRESCRIPTIONS STARTED AT TODAY'S ER VISIT  Discharge Medication List as of 2/17/2023 10:12 PM      START taking these medications    Details   dicyclomine (BENTYL) 20 MG tablet Take 1 tablet (20 mg) by mouth 4 times daily as needed, Disp-15 tablet, R-0, Local Print      ondansetron (ZOFRAN ODT) 4 MG ODT tab Take 1 tablet (4 mg) by mouth every 8 hours as needed for nausea, Disp-15 tablet, R-0, Local Print                =================================================================    HPI    Patient information was obtained from: patient     Use of : Declined, Significant other interprets        Karla B Reyes Garcia is a 37 year old female with a pertinent history of perforated ulcer, depression who presents to this  ED for evaluation of right-sided abdominal pain, nausea without vomiting.  Reports pain started suddenly last night and right back radiating to right-sided abdomen.  Does report urinary frequency, no dysuria.  Patient is on her menstrual cycle.  No changes in bowel movements. Reports she feels warms but has not checked her temperature.     Had back surgery approximately a year ago, complicated by infection, required revision and antibiotics.  Finished antibiotics in July. History of cholecystectomy. No history of kidney stones.       REVIEW OF SYSTEMS   Review of Systems   Constitutional: Negative for fever.   Gastrointestinal: Positive for abdominal pain and nausea. Negative for diarrhea and vomiting.   Genitourinary: Positive for frequency and vaginal bleeding. Negative for dysuria.   Musculoskeletal: Positive for back pain.   Skin: Negative for rash.   All other systems reviewed and are negative.      PAST MEDICAL HISTORY:  Past Medical History:   Diagnosis Date     Acid reflux      Anxiety      Chronic kidney disease      Depression      Hypertension      Medical history non-contributory        PAST SURGICAL HISTORY:  Past Surgical History:   Procedure Laterality Date      SECTION        SECTION       LAPAROSCOPIC CHOLECYSTECTOMY N/A 11/15/2019    Procedure: CHOLECYSTECTOMY, LAPAROSCOPIC;  Surgeon: Deneen Lowry DO;  Location: [facility];  Service: General       CURRENT MEDICATIONS:    dicyclomine (BENTYL) 20 MG tablet  ondansetron (ZOFRAN ODT) 4 MG ODT tab  hydrOXYzine pamoate (VISTARIL) 25 MG capsule  irbesartan (AVAPRO) 75 MG tablet  lansoprazole (PREVACID) 30 MG capsule  loratadine (CLARITIN) 10 MG tablet  omeprazole (PRILOSEC) 20 MG capsule  omeprazole (PRILOSEC) 40 MG DR capsule  risperiDONE (RISPERDAL) 0.25 MG tablet  sertraline (ZOLOFT) 100 MG tablet  traZODone (DESYREL) 50 MG tablet        ALLERGIES:  Allergies   Allergen Reactions     Lisinopril Cough       FAMILY HISTORY:  No  family history on file.    SOCIAL HISTORY:   Social History     Socioeconomic History     Marital status:    Tobacco Use     Smoking status: Never     Smokeless tobacco: Never   Substance and Sexual Activity     Alcohol use: No     Drug use: No       VITALS:  /84   Pulse 92   Temp 97.7  F (36.5  C) (Temporal)   Resp 20   LMP 02/13/2023   SpO2 100%     PHYSICAL EXAM    Constitutional: Well developed, Well nourished, NAD, GCS 15  HENT: Normocephalic, Atraumatic.   Neck- Normal ROM  Eyes: Conjunctiva normal.   Respiratory: No respiratory distress, speaking in full sentences.   Cardiovascular: Normal heart rate, Regular rhythm, No murmurs  GI: Soft, mild right sided abdominal tenderness without guarding or rebound. No CVAT.   Musculoskeletal: No deformities, Moves all extremities equally. No thoracic or lumbar spine tenderness.   Integument: Warm, Dry, No erythema, ecchymosis, or rash.  Neurologic: Alert & oriented x 3, Normal sensory function. No focal deficits.   Psychiatric: Affect normal, Judgment normal, Mood normal. Cooperative.      LAB:  All pertinent labs reviewed and interpreted.  Results for orders placed or performed during the hospital encounter of 02/17/23   CT Abdomen Pelvis w/o Contrast    Impression    IMPRESSION:   1.  No nephroureterolithiasis or hydronephrosis.  2.  Circumferential thickening of the rectum which could be seen with mild colitis. Recommend correlation with colonoscopy post resolution of acute illness, if not recently performed.  3.  Hepatic steatosis.     UA with Microscopic reflex to Culture    Specimen: Urine, Clean Catch   Result Value Ref Range    Color Urine Yellow Colorless, Straw, Light Yellow, Yellow    Appearance Urine Turbid (A) Clear    Glucose Urine Negative Negative mg/dL    Bilirubin Urine Negative Negative    Ketones Urine Negative Negative mg/dL    Specific Gravity Urine 1.024 1.001 - 1.030    Blood Urine >1.0 mg/dL (A) Negative    pH Urine 6.0 5.0 -  7.0    Protein Albumin Urine 200 (A) Negative mg/dL    Urobilinogen Urine <2.0 <2.0 mg/dL    Nitrite Urine Negative Negative    Leukocyte Esterase Urine Negative Negative    Mucus Urine Present (A) None Seen /LPF    RBC Urine >182 (H) <=2 /HPF    WBC Urine 6 (H) <=5 /HPF    Squamous Epithelials Urine 8 (H) <=1 /HPF    Hyaline Casts Urine 1 <=2 /LPF   HCG qualitative urine   Result Value Ref Range    hCG Urine Qualitative Negative Negative   Basic metabolic panel   Result Value Ref Range    Sodium 141 136 - 145 mmol/L    Potassium 4.1 3.4 - 5.3 mmol/L    Chloride 104 98 - 107 mmol/L    Carbon Dioxide (CO2) 28 22 - 29 mmol/L    Anion Gap 9 7 - 15 mmol/L    Urea Nitrogen 18.2 6.0 - 20.0 mg/dL    Creatinine 0.88 0.51 - 0.95 mg/dL    Calcium 9.2 8.6 - 10.0 mg/dL    Glucose 96 70 - 99 mg/dL    GFR Estimate 86 >60 mL/min/1.73m2   CBC with platelets and differential   Result Value Ref Range    WBC Count 10.6 4.0 - 11.0 10e3/uL    RBC Count 4.69 3.80 - 5.20 10e6/uL    Hemoglobin 13.0 11.7 - 15.7 g/dL    Hematocrit 39.4 35.0 - 47.0 %    MCV 84 78 - 100 fL    MCH 27.7 26.5 - 33.0 pg    MCHC 33.0 31.5 - 36.5 g/dL    RDW 13.6 10.0 - 15.0 %    Platelet Count 300 150 - 450 10e3/uL   Hepatic function panel   Result Value Ref Range    Protein Total 7.6 6.4 - 8.3 g/dL    Albumin 4.2 3.5 - 5.2 g/dL    Bilirubin Total 0.2 <=1.2 mg/dL    Alkaline Phosphatase 86 35 - 104 U/L    AST 23 10 - 35 U/L    ALT 22 10 - 35 U/L    Bilirubin Direct <0.20 0.00 - 0.30 mg/dL   Result Value Ref Range    Lipase 33 13 - 60 U/L   Manual Differential   Result Value Ref Range    % Neutrophils 41 %    % Lymphocytes 48 %    % Monocytes 8 %    % Eosinophils 1 %    % Basophils 2 %    Absolute Neutrophils 4.3 1.6 - 8.3 10e3/uL    Absolute Lymphocytes 5.1 0.8 - 5.3 10e3/uL    Absolute Monocytes 0.8 0.0 - 1.3 10e3/uL    Absolute Eosinophils 0.1 0.0 - 0.7 10e3/uL    Absolute Basophils 0.2 0.0 - 0.2 10e3/uL    RBC Morphology Confirmed RBC Indices     Platelet  Assessment  Automated Count Confirmed. Platelet morphology is normal.     Automated Count Confirmed. Platelet morphology is normal.       RADIOLOGY:  Reviewed all pertinent imaging. Please see official radiology report.  CT Abdomen Pelvis w/o Contrast   Final Result   IMPRESSION:    1.  No nephroureterolithiasis or hydronephrosis.   2.  Circumferential thickening of the rectum which could be seen with mild colitis. Recommend correlation with colonoscopy post resolution of acute illness, if not recently performed.   3.  Hepatic steatosis.             EKG:    None    PROCEDURES:   None        Cassie Joseph PA-C  Emergency Medicine  [facility] EMERGENCY DEPARTMENT  [address] [place] [id number]  [phone]             Cassie Joseph PA-C  02/18/23 0054

## 2023-02-18 NOTE — ED NOTES
Pt discharged to home with spouse ambulatory at 2230. All questions answered. Pt expressed understanding of info

## 2023-02-18 NOTE — ED TRIAGE NOTES
Patient presents with right flank pain-- lower back pain and abdominal pain started yesterday. Hx of back surgery with complications, infection with long term antibiotics for six months requiring second back surgery and abdominal surgery. After this kidney function was decreased, July of last year, but did not follow up due to no insurance. Denies urinary symptoms, regular BM. Currently menstruating. Reports HA and nausea.

## 2023-02-18 NOTE — DISCHARGE INSTRUCTIONS
Your labs and imaging are reassuring today.  This appears to be colitis, inflammation in the colon/intestine. This is often times a viral process.    Use zofran as needed for nausea/vomiting  Use tylenol and ibuprofen as needed for pain.   You can also try bentyl 4 times daily as needed for cramping.    Follow up in clinic on Monday for re-check.  Return to the emergency department if you develop fevers, vomiting/not keeping fluids down, worsening or changing abdominal pain, or any other concerning symptoms.  We would be happy to see you.

## 2023-09-01 ENCOUNTER — HOSPITAL ENCOUNTER (EMERGENCY)
Facility: HOSPITAL | Age: 38
Discharge: HOME | End: 2023-09-01
Attending: EMERGENCY MEDICINE | Admitting: EMERGENCY MEDICINE

## 2023-09-01 ENCOUNTER — APPOINTMENT (OUTPATIENT)
Dept: RADIOLOGY | Facility: HOSPITAL | Age: 38
End: 2023-09-01
Attending: EMERGENCY MEDICINE

## 2023-09-01 VITALS
HEIGHT: 66 IN | TEMPERATURE: 98.2 F | SYSTOLIC BLOOD PRESSURE: 141 MMHG | HEART RATE: 74 BPM | RESPIRATION RATE: 16 BRPM | WEIGHT: 190 LBS | BODY MASS INDEX: 30.53 KG/M2 | OXYGEN SATURATION: 98 % | DIASTOLIC BLOOD PRESSURE: 73 MMHG

## 2023-09-01 DIAGNOSIS — B34.9 VIRAL SYNDROME: ICD-10-CM

## 2023-09-01 LAB
FLUAV RNA SPEC QL NAA+PROBE: NEGATIVE
FLUBV RNA RESP QL NAA+PROBE: NEGATIVE
RSV RNA SPEC NAA+PROBE: NEGATIVE
SARS-COV-2 RNA RESP QL NAA+PROBE: NEGATIVE

## 2023-09-01 PROCEDURE — 99284 EMERGENCY DEPT VISIT MOD MDM: CPT | Mod: 25

## 2023-09-01 PROCEDURE — 71046 X-RAY EXAM CHEST 2 VIEWS: CPT

## 2023-09-01 PROCEDURE — 87637 SARSCOV2&INF A&B&RSV AMP PRB: CPT | Performed by: EMERGENCY MEDICINE

## 2023-09-01 RX ORDER — DIPHENHYDRAMINE HYDROCHLORIDE AND LIDOCAINE HYDROCHLORIDE AND ALUMINUM HYDROXIDE AND MAGNESIUM HYDRO
5-10 KIT EVERY 6 HOURS PRN
Qty: 237 ML | Refills: 0 | Status: SHIPPED | OUTPATIENT
Start: 2023-09-01

## 2023-09-01 ASSESSMENT — ENCOUNTER SYMPTOMS
CHILLS: 0
HEADACHES: 1
SORE THROAT: 1
FEVER: 0

## 2023-09-01 ASSESSMENT — ACTIVITIES OF DAILY LIVING (ADL): ADLS_ACUITY_SCORE: 35

## 2023-09-01 NOTE — DISCHARGE INSTRUCTIONS
The COVID and flu tests are negative, and the x-ray does not show pneumonia.  Like we discussed, I am prescribing medicine that you can swish around in your mouth to help numb and treat the mouth sores

## 2023-09-01 NOTE — ED PROVIDER NOTES
EMERGENCY DEPARTMENT ENCOUNTER     NAME: Karla B Reyes Garcia   AGE: 37 year old female   YOB: 1985   MRN: [id number]   EVALUATION DATE & TIME: 9/1/2023 10:05 AM   PCP: Alex Alexis     Chief Complaint   Patient presents with    Headache    Pharyngitis   :    FINAL IMPRESSION       1. Viral syndrome           ED COURSE & MEDICAL DECISION MAKING    10:14 AM I met with patient for initial interview and encounter. We also discussed plan for treatment and diagnostic interventions.     Pertinent Labs & Imaging studies reviewed. (See chart for details)   37 year old female  presents to the Emergency Department for evaluation of 3 days of cough, fever, mouth pain and sore throat, headache.  No known sick contacts. Initial Vitals Reviewed. Initial exam notable for patient who is afebrile and well-appearing.  Intraorally she has mouth lesions consistent with hand-foot-and-mouth disease, and although I do not see any on her hands currently this is very suggestive of a viral process along with URI symptoms.  We did test for COVID and influenza which are negative and a chest x-ray for pneumonia which is negative, and I am going to prescribe Magic mouthwash for home symptomatic management.  She is comfortable with this plan and was given instructions for management of home hand-foot-and-mouth at home.           At the conclusion of the encounter I discussed the results of all of the tests and the disposition. The questions were answered. The patient or family acknowledged understanding and was agreeable with the care plan.     0 minutes critical care time, see procedure note below for details if relevant    Medical Decision Making    History:  Supplemental history from: N/A  External Record(s) reviewed: Documented in chart, if applicable.    Work Up:  Chart documentation includes differential considered and any EKGs or imaging interpreted by provider.  In additional to work up documented, I considered the  following work up: Documented in chart, if applicable.    External consultation:  Discussion of management with another provider: Documented in chart, if applicable    Complicating factors:  Care impacted by chronic illness: Chronic Kidney Disease, Chronic Lung Disease, Hypertension, and Mental Health  Care affected by social determinants of health: Access to Medical Care    Disposition considerations: Discharge. I prescribed additional prescription strength medication(s) as charted. I considered admission, but ultimately discharged patient with reassuring exam.    MEDICATIONS GIVEN IN THE EMERGENCY:   Medications - No data to display   NEW PRESCRIPTIONS STARTED AT TODAY'S ER VISIT   New Prescriptions    No medications on file     ================================================================   HISTORY OF PRESENT ILLNESS     Patient information was obtained from: Patient  Use of Intrepreter: Yes (Phone) - Language: Faroese     Karla B Reyes Garcia is a 37 year old female with history of HTN, CKD stage 2, ALYSHA, anxiety, depression who presents to the ED for evaluation of flu-like symptoms.    Patient reports a 2 days history of several flu-like complaints including cough, headache, nasal congestion, sore throat, and mouth sores.  No sores on the hands. Took ibuprofen this morning without significant relief.  No known sick contacts.    Denies fever or chills.    ================================================================    REVIEW OF SYSTEMS     Review of Systems   Constitutional:  Negative for chills and fever.   HENT:  Positive for congestion, mouth sores and sore throat.    Neurological:  Positive for headaches.   All other systems reviewed and are negative.        PAST HISTORY     PAST MEDICAL HISTORY:   Past Medical History:   Diagnosis Date    Acid reflux     Anxiety     Chronic kidney disease     Depression     Hypertension     Medical history non-contributory       PAST SURGICAL HISTORY:   Past Surgical  "History:   Procedure Laterality Date     SECTION       SECTION      LAPAROSCOPIC CHOLECYSTECTOMY N/A 11/15/2019    Procedure: CHOLECYSTECTOMY, LAPAROSCOPIC;  Surgeon: Deneen Lowry DO;  Location: [facility];  Service: General      CURRENT MEDICATIONS:   hydrOXYzine pamoate (VISTARIL) 25 MG capsule  irbesartan (AVAPRO) 75 MG tablet  lansoprazole (PREVACID) 30 MG capsule  loratadine (CLARITIN) 10 MG tablet  omeprazole (PRILOSEC) 20 MG capsule  omeprazole (PRILOSEC) 40 MG DR capsule  risperiDONE (RISPERDAL) 0.25 MG tablet  sertraline (ZOLOFT) 100 MG tablet  traZODone (DESYREL) 50 MG tablet      ALLERGIES:   Allergies   Allergen Reactions    Lisinopril Cough      FAMILY HISTORY:   No family history on file.   SOCIAL HISTORY:   Social History     Socioeconomic History    Marital status:    Tobacco Use    Smoking status: Never    Smokeless tobacco: Never   Substance and Sexual Activity    Alcohol use: No    Drug use: No        VITALS  Patient Vitals for the past 24 hrs:   BP Temp Temp src Pulse Resp SpO2 Height Weight   23 1004 (!) 141/73 98.2  F (36.8  C) Oral 74 16 98 % 1.676 m (5' 6\") 86.2 kg (190 lb)        ================================================================    PHYSICAL EXAM     VITAL SIGNS: BP (!) 141/73   Pulse 74   Temp 98.2  F (36.8  C) (Oral)   Resp 16   Ht 1.676 m (5' 6\")   Wt 86.2 kg (190 lb)   SpO2 98%   BMI 30.67 kg/m     Constitutional:  Awake, no acute distress   HENT:  Atraumatic, oropharynx without exudate or erythema, membranes moist, several oral lesions to lips, cheeks, and palate.   Lymph:  No adenopathy  Eyes: EOM intact, PERRL, no injection  Neck: Supple  Respiratory:  Clear to auscultation bilaterally, no wheezes or crackles   Cardiovascular:  Regular rate and rhythm, single S1 and S2   GI:  Soft, nontender, nondistended, no rebound or guarding   Musculoskeletal:  Moves all extremities, no lower extremity edema, no deformities  "   Extremities: no visible lesions to hands  Skin:  Warm, dry  Neurologic:  Alert and oriented x3, no focal deficits noted     ================================================================  LAB     All pertinent labs reviewed and interpreted.   Labs Ordered and Resulted from Time of ED Arrival to Time of ED Departure   INFLUENZA A/B, RSV, & SARS-COV2 PCR - Normal       Result Value    Influenza A PCR Negative      Influenza B PCR Negative      RSV PCR Negative      SARS CoV2 PCR Negative          ===============================================================  RADIOLOGY     Reviewed all pertinent imaging. Please see official radiology report.   Chest XR,  PA & LAT   Final Result   IMPRESSION: Negative chest.          ================================================================  EKG     I have independently reviewed and interpreted the EKG(s) documented above.    ================================================================  PROCEDURES       I, Sterling Dover, am serving as a scribe to document services personally performed by Dr. Hernandez based on my observation and the provider's statements to me. I, Hilary Hernandez MD attest that Sterling Dover is acting in a scribe capacity, has observed my performance of the services and has documented them in accordance with my direction.     Hilary Hernandez M.D.   Emergency Medicine   [facility] EMERGENCY DEPARTMENT  [address] [place] [id number] [phone]  Dept: [phone]      Hilary Hernandez MD  09/01/23 1146

## 2023-09-01 NOTE — ED TRIAGE NOTES
Pt arrives with headache, sore throat, dry cough, nausea starting yesterday. Ibuprofen taken at 0700 today. No fevers or chills. Requesting .

## 2024-05-19 ENCOUNTER — HOSPITAL ENCOUNTER (EMERGENCY)
Facility: HOSPITAL | Age: 39
Discharge: HOME | End: 2024-05-19
Attending: EMERGENCY MEDICINE | Admitting: EMERGENCY MEDICINE

## 2024-05-19 VITALS
WEIGHT: 195 LBS | SYSTOLIC BLOOD PRESSURE: 142 MMHG | RESPIRATION RATE: 16 BRPM | BODY MASS INDEX: 30.61 KG/M2 | HEART RATE: 76 BPM | DIASTOLIC BLOOD PRESSURE: 88 MMHG | TEMPERATURE: 98.5 F | HEIGHT: 67 IN | OXYGEN SATURATION: 100 %

## 2024-05-19 DIAGNOSIS — R19.7 NAUSEA VOMITING AND DIARRHEA: ICD-10-CM

## 2024-05-19 DIAGNOSIS — E86.0 DEHYDRATION: ICD-10-CM

## 2024-05-19 DIAGNOSIS — R52 BODY ACHES: ICD-10-CM

## 2024-05-19 DIAGNOSIS — R11.2 NAUSEA VOMITING AND DIARRHEA: ICD-10-CM

## 2024-05-19 LAB
ALBUMIN UR-MCNC: 300 MG/DL
ANION GAP SERPL CALCULATED.3IONS-SCNC: 9 MMOL/L (ref 7–15)
APPEARANCE UR: CLEAR
BACTERIA #/AREA URNS HPF: ABNORMAL /HPF
BASOPHILS # BLD AUTO: 0 10E3/UL (ref 0–0.2)
BASOPHILS NFR BLD AUTO: 1 %
BILIRUB UR QL STRIP: NEGATIVE
BUN SERPL-MCNC: 15.1 MG/DL (ref 6–20)
CALCIUM SERPL-MCNC: 8.4 MG/DL (ref 8.6–10)
CHLORIDE SERPL-SCNC: 102 MMOL/L (ref 98–107)
CK SERPL-CCNC: 53 U/L (ref 26–192)
COLOR UR AUTO: ABNORMAL
CREAT SERPL-MCNC: 0.84 MG/DL (ref 0.51–0.95)
DEPRECATED HCO3 PLAS-SCNC: 26 MMOL/L (ref 22–29)
EGFRCR SERPLBLD CKD-EPI 2021: >90 ML/MIN/1.73M2
EOSINOPHIL # BLD AUTO: 0.4 10E3/UL (ref 0–0.7)
EOSINOPHIL NFR BLD AUTO: 7 %
ERYTHROCYTE [DISTWIDTH] IN BLOOD BY AUTOMATED COUNT: 13.2 % (ref 10–15)
GLUCOSE SERPL-MCNC: 102 MG/DL (ref 70–99)
GLUCOSE UR STRIP-MCNC: NEGATIVE MG/DL
HCT VFR BLD AUTO: 42.1 % (ref 35–47)
HGB BLD-MCNC: 14 G/DL (ref 11.7–15.7)
HGB UR QL STRIP: ABNORMAL
IMM GRANULOCYTES # BLD: 0 10E3/UL
IMM GRANULOCYTES NFR BLD: 0 %
KETONES UR STRIP-MCNC: NEGATIVE MG/DL
LEUKOCYTE ESTERASE UR QL STRIP: NEGATIVE
LYMPHOCYTES # BLD AUTO: 1.9 10E3/UL (ref 0.8–5.3)
LYMPHOCYTES NFR BLD AUTO: 33 %
MAGNESIUM SERPL-MCNC: 2 MG/DL (ref 1.7–2.3)
MCH RBC QN AUTO: 28.2 PG (ref 26.5–33)
MCHC RBC AUTO-ENTMCNC: 33.3 G/DL (ref 31.5–36.5)
MCV RBC AUTO: 85 FL (ref 78–100)
MONOCYTES # BLD AUTO: 0.5 10E3/UL (ref 0–1.3)
MONOCYTES NFR BLD AUTO: 8 %
MUCOUS THREADS #/AREA URNS LPF: PRESENT /LPF
NEUTROPHILS # BLD AUTO: 3 10E3/UL (ref 1.6–8.3)
NEUTROPHILS NFR BLD AUTO: 51 %
NITRATE UR QL: NEGATIVE
NRBC # BLD AUTO: 0 10E3/UL
NRBC BLD AUTO-RTO: 0 /100
PH UR STRIP: 6.5 [PH] (ref 5–7)
PLATELET # BLD AUTO: 263 10E3/UL (ref 150–450)
POTASSIUM SERPL-SCNC: 3.7 MMOL/L (ref 3.4–5.3)
RBC # BLD AUTO: 4.96 10E6/UL (ref 3.8–5.2)
RBC URINE: 2 /HPF
SODIUM SERPL-SCNC: 137 MMOL/L (ref 135–145)
SP GR UR STRIP: 1.02 (ref 1–1.03)
SQUAMOUS EPITHELIAL: 4 /HPF
UROBILINOGEN UR STRIP-MCNC: <2 MG/DL
WBC # BLD AUTO: 5.9 10E3/UL (ref 4–11)
WBC URINE: 1 /HPF

## 2024-05-19 PROCEDURE — 250N000013 HC RX MED GY IP 250 OP 250 PS 637: Performed by: EMERGENCY MEDICINE

## 2024-05-19 PROCEDURE — 96374 THER/PROPH/DIAG INJ IV PUSH: CPT

## 2024-05-19 PROCEDURE — 250N000011 HC RX IP 250 OP 636: Performed by: EMERGENCY MEDICINE

## 2024-05-19 PROCEDURE — 81001 URINALYSIS AUTO W/SCOPE: CPT | Performed by: EMERGENCY MEDICINE

## 2024-05-19 PROCEDURE — 258N000003 HC RX IP 258 OP 636: Performed by: EMERGENCY MEDICINE

## 2024-05-19 PROCEDURE — 36415 COLL VENOUS BLD VENIPUNCTURE: CPT | Performed by: EMERGENCY MEDICINE

## 2024-05-19 PROCEDURE — 93005 ELECTROCARDIOGRAM TRACING: CPT | Performed by: EMERGENCY MEDICINE

## 2024-05-19 PROCEDURE — 80048 BASIC METABOLIC PNL TOTAL CA: CPT | Performed by: EMERGENCY MEDICINE

## 2024-05-19 PROCEDURE — 82550 ASSAY OF CK (CPK): CPT | Performed by: EMERGENCY MEDICINE

## 2024-05-19 PROCEDURE — 99284 EMERGENCY DEPT VISIT MOD MDM: CPT | Mod: 25

## 2024-05-19 PROCEDURE — 96361 HYDRATE IV INFUSION ADD-ON: CPT

## 2024-05-19 PROCEDURE — 83735 ASSAY OF MAGNESIUM: CPT | Performed by: EMERGENCY MEDICINE

## 2024-05-19 PROCEDURE — 85025 COMPLETE CBC W/AUTO DIFF WBC: CPT | Performed by: EMERGENCY MEDICINE

## 2024-05-19 RX ORDER — ONDANSETRON 2 MG/ML
4 INJECTION INTRAMUSCULAR; INTRAVENOUS ONCE
Status: COMPLETED | OUTPATIENT
Start: 2024-05-19 | End: 2024-05-19

## 2024-05-19 RX ORDER — ACETAMINOPHEN 325 MG/1
975 TABLET ORAL ONCE
Status: COMPLETED | OUTPATIENT
Start: 2024-05-19 | End: 2024-05-19

## 2024-05-19 RX ADMIN — ONDANSETRON 4 MG: 2 INJECTION INTRAMUSCULAR; INTRAVENOUS at 15:14

## 2024-05-19 RX ADMIN — SODIUM CHLORIDE 1000 ML: 9 INJECTION, SOLUTION INTRAVENOUS at 15:13

## 2024-05-19 RX ADMIN — ACETAMINOPHEN 975 MG: 325 TABLET ORAL at 15:14

## 2024-05-19 ASSESSMENT — COLUMBIA-SUICIDE SEVERITY RATING SCALE - C-SSRS
2. HAVE YOU ACTUALLY HAD ANY THOUGHTS OF KILLING YOURSELF IN THE PAST MONTH?: NO
1. IN THE PAST MONTH, HAVE YOU WISHED YOU WERE DEAD OR WISHED YOU COULD GO TO SLEEP AND NOT WAKE UP?: NO
6. HAVE YOU EVER DONE ANYTHING, STARTED TO DO ANYTHING, OR PREPARED TO DO ANYTHING TO END YOUR LIFE?: NO

## 2024-05-19 ASSESSMENT — ACTIVITIES OF DAILY LIVING (ADL)
ADLS_ACUITY_SCORE: 35
ADLS_ACUITY_SCORE: 33

## 2024-05-19 NOTE — ED TRIAGE NOTES
Pt states that she started to feel ill two days ago with nausea, vomiting, and diarrhea. This morning, she developed light-headedness, headache, and body aches.      Triage Assessment (Adult)       Row Name 05/19/24 3020          Triage Assessment    Airway WDL WDL        Respiratory WDL    Respiratory WDL WDL        Skin Circulation/Temperature WDL    Skin Circulation/Temperature WDL WDL        Cardiac WDL    Cardiac WDL WDL        Peripheral/Neurovascular WDL    Peripheral Neurovascular WDL WDL        Cognitive/Neuro/Behavioral WDL    Cognitive/Neuro/Behavioral WDL WDL

## 2024-05-19 NOTE — ED NOTES
"N/V/D x2 days. Today, bilateral leg pain and lower abdominal pain began, pt rates 9/10 constant/sharp, and feel palpations in chest. States, \"I don't know if it's because I'm nervous?\"   "

## 2024-05-19 NOTE — ED PROVIDER NOTES
EMERGENCY DEPARTMENT ENCOUNTER      NAME: Karla B Reyes Garcia  AGE: 38 year old female  YOB: 1985  MRN: [id number]  EVALUATION DATE & TIME: 5/19/2024  2:04 PM    PCP: [facility]    ED PROVIDER: Rajwinder Delgado M.D.      Chief Complaint   Patient presents with    Nausea, Vomiting, & Diarrhea    Dizziness     FINAL IMPRESSION:  1. Nausea vomiting and diarrhea    2. Body aches    3. Dehydration        ED COURSE & MEDICAL DECISION MAKING:    Pertinent Labs & Imaging studies reviewed. (See chart for details)  ED Course as of 05/19/24 2245   Sun May 19, 2024   4626 Patient is a pleasant 38-year-old female who presents today not feeling well.  She said 2 minutes ago she had a lot of vomiting and diarrhea.  She was not having any pain.  It occurred overnight but then stopped.  Yesterday she was having some pain in her legs and her back and some lower abdominal cramping.  Today she has become dizzy and nauseated and has a little bit of a headache.  She drink some fluids yesterday but was not able to eat anything.  She just generally not feeling well.  She denies pregnancy.  She does report fevers and chills.  She denies urinary symptoms but does have a history of protein in the urine and follows with nephrology.  They were going to do some lupus testing for her and she still has a follow-up appointment scheduled so she does not know the results of that.  She has a history of hypertension but is not taking her blood pressure medicines because of the nausea the last 2 days.  The lower abdominal pain is crampy in nature and when I push on her belly she has no tenderness.  Her legs are little tender.  I ordered some basic labs for her.  I am most concerned about renal failure or electrolyte abnormality.  I added on a CK because of the leg tenderness.  Will give her some fluids and some nausea medicine and see how we get her feeling.  She also reported some palpitations so I ordered an EKG to  LOC: The patient is awake, alert and aware of environment with an appropriate affect, the patient is oriented x 3 and speaking appropriately.  APPEARANCE: Patient resting comfortably and in no acute distress, patient is clean and well groomed  SKIN: The skin is warm and dry, color consistent with ethnicity, patient has normal skin turgor and moist mucus membranes, skin intact, no breakdown or brusing noted.  MUSCULOSKELETAL: Patient moving all extremities well, no obvious swelling or deformities noted. Pt states she fell yesterday and landed on her bottom, pt reports pain to bottom and BLE.   RESPIRATORY: Airway is open and patent, breath sounds clear throughout all lung fields; respirations are spontaneous, patient has a normal effort and rate, no accessory muscle use noted.   CARDIAC: Patient has no peripheral edema noted, capillary refill < 3 seconds. No complaints of chest pain   ABDOMEN: Soft and tender to palpation R/LLQ, no distention noted. Bowel sounds present x 4  : Pt reports burning on urination.  NEUROLOGIC: PERRL, 3mm bilaterally, eyes open spontaneously, behavior appropriate to situation, follows commands, facial expression symmetrical, bilateral hand grasp equal and even, purposeful motor response noted, normal sensation in all extremities when touched with a finger.     further evaluate.  That could go along with electrolyte abnormalities as well.  I discussed the plan with the patient and she is in agreement.   1458 I did give the patient some Tylenol here for her headache and would consider a little Toradol if her kidney function is okay.   1623 Workup here has been largely unremarkable.  I will check back in on the patient and see how she is feeling.   1642 ECG 12-LEAD WITH MUSE (LHE)   1716 Check back in on the patient.  She is feeling much better.  Her achiness and nausea are significantly improved.  I offered her nausea medication to go home with and she declined.  She is ready to go home.  I discussed the results with her and the plan for discharge.       Medical Decision Making    History:  Supplemental history from: None  External Record(s) reviewed: I reviewed the note from 4/16/2024.  The patient's nephrologist had noted that the patient has persistent protein in the urine despite being on irbesartan.  Her GINO is positive which was concerning for potentially underlying immunologic disease.  They thought she should proceed with a kidney biopsy.  She was then waiting to hear about her insurance coverage so she should schedule.    Work Up:  Emergent/Severe conditions considered and evaluated for: Acute kidney injury, dehydration, rhabdomyolysis, electrolyte abnormality, arrhythmia, urinary tract infection, anemia  I independently reviewed and interpreted EKG  In additional to work up documented, I considered the following work up: None  Medications given that require intensive monitoring for toxicity: None    External consultation:  Discussion of management with another provider: None    Complicating factors:  Care impacted by chronic illness: Chronic kidney disease, hypertension  Care affected by social determinants of health: Access to care, ability to pay for care    Disposition considerations: Considered admission as I was suspicious the patient could have electrolyte  abnormality, rhabdo, or acute renal failure but all that came back unremarkable and she was feeling significantly better after some Tylenol and Zofran and some IV fluids and was able to discharge home.  Prescriptions considered/prescribed: Considered Zofran but patient declined.  She was doing better and did not feel that she needed an nausea medication at home.    At the conclusion of the encounter I discussed  the results of all of the tests and the disposition with patient.   All questions were answered.  The patient acknowledged understanding and was involved in the decision making regarding the overall care plan.      I discussed with patient the utility, limitations and findings of the exam/interventions/studies done during this visit as well as the list of differential diagnosis and symptoms to monitor/return to ER for.  Additional verbal discharge instructions were provided.     MEDICATIONS GIVEN IN THE EMERGENCY:  Medications   sodium chloride 0.9% BOLUS 1,000 mL (0 mLs Intravenous Stopped 5/19/24 5798)   ondansetron (ZOFRAN) injection 4 mg (4 mg Intravenous $Given 5/19/24 1514)   acetaminophen (TYLENOL) tablet 975 mg (975 mg Oral $Given 5/19/24 1514)       NEW PRESCRIPTIONS STARTED AT TODAY'S ER VISIT  Discharge Medication List as of 5/19/2024  5:18 PM             =================================================================    HPI    Triage Note: Pt states that she started to feel ill two days ago with nausea, vomiting, and diarrhea. This morning, she developed light-headedness, headache, and body aches.      Triage Assessment (Adult)       Row Name 05/19/24 140          Triage Assessment    Airway WDL WDL        Respiratory WDL    Respiratory WDL WDL        Skin Circulation/Temperature WDL    Skin Circulation/Temperature WDL WDL        Cardiac WDL    Cardiac WDL WDL        Peripheral/Neurovascular WDL    Peripheral Neurovascular WDL WDL        Cognitive/Neuro/Behavioral WDL     "Cognitive/Neuro/Behavioral WDL WDL                   Patient information was obtained from: Patient    Use of : N/A       Karla B Reyes Garcia is a 38 year old female with a history of chronic kidney disease, hypertension, anxiety, and depression who presents with several complaints.     Two nights ago, patient had N/V/D that later resolved. Yesterday, she developed persistent leg pain, poor appetite, and suprapubic \"cramps.\" Today, she then began to feel nauseous again with dizziness, headache, chills, palpations that she attributes to anxiety, and subjective fever. No trouble breathing. No hematuria. Denies concern for pregnancy.      Of note, patient hasn't been able to take her anti-hypertensives for the past two days due to her symptoms. She has recently been tested for lupus due to presence of proteinuria, but has yet to follow up and receive those results.      Per Chart Review:  Seen at [facility] on 4/10/24 for proteinuria. She was diagnosed with kidney diease and discharged on 75mg of irbesartan. Following up 3 months past that date with consideration for a kidney biopsy if her medical coverage resumes.    PAST MEDICAL HISTORY:  Past Medical History:   Diagnosis Date    Acid reflux     Anxiety     Chronic kidney disease     Depression     Hypertension     Medical history non-contributory        PAST SURGICAL HISTORY:  Past Surgical History:   Procedure Laterality Date     SECTION       SECTION      LAPAROSCOPIC CHOLECYSTECTOMY N/A 11/15/2019    Procedure: CHOLECYSTECTOMY, LAPAROSCOPIC;  Surgeon: Deneen Lowry DO;  Location: [facility];  Service: General       CURRENT MEDICATIONS:    No current facility-administered medications for this encounter.    Current Outpatient Medications:     hydrOXYzine pamoate (VISTARIL) 25 MG capsule, [HYDROXYZINE PAMOATE (VISTARIL) 25 MG CAPSULE] Take 25 mg by mouth 3 (three) times a day as needed for itching., Disp: , Rfl:     " irbesartan (AVAPRO) 75 MG tablet, TAKE 1 AND 1/2 TABLETS BY MOUTH EVERY EVENING, Disp: , Rfl:     lansoprazole (PREVACID) 30 MG capsule, [LANSOPRAZOLE (PREVACID) 30 MG CAPSULE] Take 1 capsule (30 mg total) by mouth daily. (Patient not taking: Reported on 1/18/2022), Disp: 30 capsule, Rfl: 2    loratadine (CLARITIN) 10 MG tablet, Take 1 tablet by mouth daily (Patient not taking: Reported on 1/18/2022), Disp: , Rfl:     magic mouthwash suspension, diphenhydrAMINE, lidocaine, aluminum-magnesium & simethicone, (FIRST-MOUTHWASH BLM) compounding kit, Swish and swallow 5-10 mLs in mouth every 6 hours as needed for mouth sores or mild pain, Disp: 237 mL, Rfl: 0    omeprazole (PRILOSEC) 20 MG capsule, [OMEPRAZOLE (PRILOSEC) 20 MG CAPSULE] Take 1 capsule (20 mg total) by mouth daily before breakfast., Disp: 90 capsule, Rfl: 1    omeprazole (PRILOSEC) 40 MG DR capsule, Take 1 capsule (40 mg) by mouth 2 times daily (before meals), Disp: 90 capsule, Rfl: 3    risperiDONE (RISPERDAL) 0.25 MG tablet, [RISPERIDONE (RISPERDAL) 0.25 MG TABLET] Take 1 tablet (0.25 mg total) by mouth 3 (three) times a day. (Patient not taking: Reported on 1/18/2022), Disp: 90 tablet, Rfl: 0    sertraline (ZOLOFT) 100 MG tablet, [SERTRALINE (ZOLOFT) 100 MG TABLET] Take 1 1/2 tab x tab daily (Patient not taking: Reported on 1/18/2022), Disp: , Rfl:     traZODone (DESYREL) 50 MG tablet, [TRAZODONE (DESYREL) 50 MG TABLET] Take 1 tablet (50 mg total) by mouth at bedtime as needed, may repeat once for sleep (melatonin augmentation or failure). (Patient not taking: Reported on 1/18/2022), Disp: 30 tablet, Rfl: 0    ALLERGIES:  Allergies   Allergen Reactions    Lisinopril Cough       FAMILY HISTORY:  No family history on file.    SOCIAL HISTORY:   Social History     Socioeconomic History    Marital status:    Tobacco Use    Smoking status: Never    Smokeless tobacco: Never   Substance and Sexual Activity    Alcohol use: No    Drug use: No     Social  "Determinants of Health      [facility], [facility]    Social Connections       PHYSICAL EXAM    VITAL SIGNS: BP (!) 142/88   Pulse 76   Temp 98.5  F (36.9  C) (Oral)   Resp 16   Ht 1.702 m (5' 7\")   Wt 88.5 kg (195 lb)   SpO2 100%   BMI 30.54 kg/m     GENERAL: Awake, alert, answering questions appropriately, appears uncomfortable  SPEECH:  Easy to understand speech, Normal volume and alessia  PULMONARY: No respiratory distress, Lungs clear to auscultation bilaterally  CARDIOVASCULAR: Regular rate and rhythm, Distal pulses present and normal.  ABDOMINAL: Soft, Nondistended, Nontender, No rebound or guarding, No palpable masses  EXTREMITIES: No lower extremity edema.  Mild bilateral lower leg tenderness  PSYCH: Normal mood and affect     LAB:  All pertinent labs reviewed and interpreted.  Results for orders placed or performed during the hospital encounter of 05/19/24   Basic metabolic panel   Result Value Ref Range    Sodium 137 135 - 145 mmol/L    Potassium 3.7 3.4 - 5.3 mmol/L    Chloride 102 98 - 107 mmol/L    Carbon Dioxide (CO2) 26 22 - 29 mmol/L    Anion Gap 9 7 - 15 mmol/L    Urea Nitrogen 15.1 6.0 - 20.0 mg/dL    Creatinine 0.84 0.51 - 0.95 mg/dL    GFR Estimate >90 >60 mL/min/1.73m2    Calcium 8.4 (L) 8.6 - 10.0 mg/dL    Glucose 102 (H) 70 - 99 mg/dL   Result Value Ref Range    Magnesium 2.0 1.7 - 2.3 mg/dL   UA with Microscopic reflex to Culture    Specimen: Urine, Clean Catch   Result Value Ref Range    Color Urine Light Yellow Colorless, Straw, Light Yellow, Yellow    Appearance Urine Clear Clear    Glucose Urine Negative Negative mg/dL    Bilirubin Urine Negative Negative    Ketones Urine Negative Negative mg/dL    Specific Gravity Urine 1.019 1.001 - 1.030    Blood Urine 0.03 mg/dL (A) Negative    pH Urine 6.5 5.0 - 7.0    Protein Albumin Urine 300 (A) Negative mg/dL    Urobilinogen Urine <2.0 <2.0 mg/dL    Nitrite " Urine Negative Negative    Leukocyte Esterase Urine Negative Negative    Bacteria Urine Few (A) None Seen /HPF    Mucus Urine Present (A) None Seen /LPF    RBC Urine 2 <=2 /HPF    WBC Urine 1 <=5 /HPF    Squamous Epithelials Urine 4 (H) <=1 /HPF   Result Value Ref Range    CK 53 26 - 192 U/L   CBC with platelets and differential   Result Value Ref Range    WBC Count 5.9 4.0 - 11.0 10e3/uL    RBC Count 4.96 3.80 - 5.20 10e6/uL    Hemoglobin 14.0 11.7 - 15.7 g/dL    Hematocrit 42.1 35.0 - 47.0 %    MCV 85 78 - 100 fL    MCH 28.2 26.5 - 33.0 pg    MCHC 33.3 31.5 - 36.5 g/dL    RDW 13.2 10.0 - 15.0 %    Platelet Count 263 150 - 450 10e3/uL    % Neutrophils 51 %    % Lymphocytes 33 %    % Monocytes 8 %    % Eosinophils 7 %    % Basophils 1 %    % Immature Granulocytes 0 %    NRBCs per 100 WBC 0 <1 /100    Absolute Neutrophils 3.0 1.6 - 8.3 10e3/uL    Absolute Lymphocytes 1.9 0.8 - 5.3 10e3/uL    Absolute Monocytes 0.5 0.0 - 1.3 10e3/uL    Absolute Eosinophils 0.4 0.0 - 0.7 10e3/uL    Absolute Basophils 0.0 0.0 - 0.2 10e3/uL    Absolute Immature Granulocytes 0.0 <=0.4 10e3/uL    Absolute NRBCs 0.0 10e3/uL       EKG:    Date and time: May 19, 2024 at 1500  Rate: 79 bpm  Rhythm: Normal sinus rhythm  TX interval: 170 ms  QRS interval: 82 ms  QT/QTc: 380/435 ms  ST changes or T wave changes: No acute ST or T wave abnormalities  Change from prior ECG: No significant change from prior  I have independently reviewed and interpreted this EKG.     I, Aniya Miller, am serving as a scribe to document services personally performed by Dr. Delgado based on my observation and the provider's statements to me. I, Rajwinder Delgado MD attest that Aniya Miller is acting in a scribe capacity, has observed my performance of the services and has documented them in accordance with my direction.     Rajwinder Delgado M.D.  Emergency Medicine  [facility] EMERGENCY DEPARTMENT  [address]  [facility] [address]  [phone]  Dept: [phone]       Rajwinder Delgado MD  05/19/24 9742

## 2024-05-19 NOTE — DISCHARGE INSTRUCTIONS
You were seen in the Emergency Department today for evaluation of nausea, vomiting, and diarrhea no body aches or fatigue.  Your lab work showed no cause of your symptoms. Follow up with your primary care physician to ensure resolution of symptoms. Return if you have new or worsening symptoms.

## 2024-05-26 LAB
ATRIAL RATE - MUSE: 79 BPM
DIASTOLIC BLOOD PRESSURE - MUSE: NORMAL MMHG
INTERPRETATION ECG - MUSE: NORMAL
P AXIS - MUSE: 47 DEGREES
PR INTERVAL - MUSE: 170 MS
QRS DURATION - MUSE: 82 MS
QT - MUSE: 380 MS
QTC - MUSE: 435 MS
R AXIS - MUSE: 31 DEGREES
SYSTOLIC BLOOD PRESSURE - MUSE: NORMAL MMHG
T AXIS - MUSE: 36 DEGREES
VENTRICULAR RATE- MUSE: 79 BPM

## 2024-07-25 ENCOUNTER — APPOINTMENT (OUTPATIENT)
Dept: ADMINISTRATIVE | Facility: CLINIC | Age: 39
End: 2024-07-25